# Patient Record
Sex: MALE | Race: WHITE | Employment: OTHER | URBAN - METROPOLITAN AREA
[De-identification: names, ages, dates, MRNs, and addresses within clinical notes are randomized per-mention and may not be internally consistent; named-entity substitution may affect disease eponyms.]

---

## 2020-01-22 ENCOUNTER — OFFICE VISIT (OUTPATIENT)
Dept: SURGERY | Age: 85
End: 2020-01-22
Payer: MEDICAID

## 2020-01-22 ENCOUNTER — TELEPHONE (OUTPATIENT)
Dept: SURGERY | Age: 85
End: 2020-01-22

## 2020-01-22 VITALS
SYSTOLIC BLOOD PRESSURE: 127 MMHG | HEART RATE: 87 BPM | BODY MASS INDEX: 20.25 KG/M2 | OXYGEN SATURATION: 90 % | DIASTOLIC BLOOD PRESSURE: 85 MMHG | HEIGHT: 67 IN | WEIGHT: 129 LBS

## 2020-01-22 PROCEDURE — 43762 RPLC GTUBE NO REVJ TRC: CPT | Performed by: SURGERY

## 2020-01-22 PROCEDURE — 99213 OFFICE O/P EST LOW 20 MIN: CPT | Performed by: SURGERY

## 2020-01-22 NOTE — TELEPHONE ENCOUNTER
20fr peg tube exchange done in office.   Electronically signed by Darien Schumacher RN on 1/22/2020 at 11:58 AM

## 2020-01-23 NOTE — PROGRESS NOTES
General Surgery History and Physical  [facility]    Patient's Name/Date of Birth: Georgina Torres / 1935    Date: January 23, 2020     Surgeon: Estefany Martinez M.D.    PCP: Una Easley DO     Chief Complaint: PEG malfunction    HPI:   Georgina Torres is a 82 y.o. male who presents for evaluation of PEG malfunction. Timing is constant, radiation to midline, alleviated by none and started unk and severity is 7/10. Brought to office with no accompanying care giver. No family present. PEG tube evaluated as was the source of his appointment. Appears in poor repair. Patient is nonverbal. Tube appears cracked and will not flush.      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    Falls frequently    Malnutrition of moderate degree (HCC)    Acute kidney injury ([address]    Hyperosmolality and hypernatremia    Hyperchloremia    Hypermagnesemia    Hypophosphatemia    Hypotension    Anemia, chronic disease    PEG tube malfunction (HCC)    Hypertension    Hyperlipidemia    Dysphagia    Dementia ([address]    Alzheimer's dementia ([address]    Parkinson disease ([address]    Malnutrition ([address]       Past Medical History:   Diagnosis Date    Altered mental status     Alzheimer's dementia ([address]     Anemia     Arthritis     Chronic kidney failure     Dementia (HCC)     Dysphagia     GERD (gastroesophageal reflux disease)     Hyperlipidemia     Hypertension     Malnutrition ([address]     Parkinson disease ([address]     Skin cancer     treating skin ca up to one year ago       Past Surgical History:   Procedure Laterality Date    ENDOSCOPY, COLON, DIAGNOSTIC      peg tube insertion    HEMORRHOID SURGERY      HERNIA REPAIR      NOSE SURGERY         Allergies   Allergen Reactions    No Known Allergies        The

## 2020-10-26 ENCOUNTER — OFFICE VISIT (OUTPATIENT)
Dept: SURGERY | Age: 85
End: 2020-10-26
Payer: MEDICAID

## 2020-10-26 VITALS
HEIGHT: 67 IN | DIASTOLIC BLOOD PRESSURE: 78 MMHG | HEART RATE: 92 BPM | BODY MASS INDEX: 20.25 KG/M2 | SYSTOLIC BLOOD PRESSURE: 121 MMHG | WEIGHT: 129 LBS | TEMPERATURE: 98.8 F

## 2020-10-26 PROCEDURE — 99213 OFFICE O/P EST LOW 20 MIN: CPT | Performed by: SURGERY

## 2020-10-26 RX ORDER — NYSTATIN 100000 [USP'U]/G
POWDER TOPICAL
COMMUNITY

## 2020-10-26 NOTE — PROGRESS NOTES
General Surgery History and Physical  [facility]    Patient's Name/Date of Birth: Jaqueline Lennon / 1935    Date: October 26, 2020     Surgeon: Ava Carmona M.D.    PCP: Mil De Dios DO     Chief Complaint: PEG dislodgment    HPI:   Jaqueline Lennon is a 82 y.o. male who presents for evaluation of PEG dislodgment, malfunction. Timing is constant, radiation to upper abdomen, alleviated by none and started yesterday and severity is 7/10. Patient with a history of severe altered mental status immobility and dementia. Has a PEG tube in place for nutritional feedings has had the PEG become dislodged twice over the last week. Was seen in the emergency department had a replaced with an 25 Setswana Garcia catheter. Patient now presents today for replacement of his PEG tube. He is accompanied by caregiver and he is brought in by squad and remains on the gurney. He is nonverbal history was obtained from EMR.     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    Falls frequently    Malnutrition of moderate degree (HCC)    Acute kidney injury ([address]    Hyperosmolality and hypernatremia    Hyperchloremia    Hypermagnesemia    Hypophosphatemia    Hypotension    Anemia, chronic disease    PEG tube malfunction (HCC)    Hypertension    Hyperlipidemia    Dysphagia    Dementia ([address]    Alzheimer's dementia ([address]    Parkinson disease ([address]    Malnutrition ([address]       Past Medical History:   Diagnosis Date    Altered mental status     Alzheimer's dementia ([address]     Anemia     Arthritis     Chronic kidney failure     Dementia (HCC)     Dysphagia     GERD (gastroesophageal reflux disease)     Hyperlipidemia     Hypertension     Malnutrition ([address]     Parkinson disease ([address]     Skin cancer treating skin ca up to one year ago       Past Surgical History:   Procedure Laterality Date    ENDOSCOPY, COLON, DIAGNOSTIC      peg tube insertion    HEMORRHOID SURGERY      HERNIA REPAIR      NOSE SURGERY         Allergies   Allergen Reactions    No Known Allergies        The patient has a family history that is negative for severe cardiovascular or respiratory issues, negative for reaction to anesthesia. Time spent reviewing past medical, surgical, social and family history, vitals, nursing assessment and images. No changes from above documented history.     Social History     Socioeconomic History    Marital status:      Spouse name: Not on file    Number of children: Not on file    Years of education: Not on file    Highest education level: Not on file   Occupational History    Not on file   Social Needs    Financial resource strain: Not on file    Food insecurity     Worry: Not on file     Inability: Not on file    Transportation needs     Medical: Not on file     Non-medical: Not on file   Tobacco Use    Smoking status: Never Smoker    Smokeless tobacco: Never Used   Substance and Sexual Activity    Alcohol use: No    Drug use: No    Sexual activity: Never   Lifestyle    Physical activity     Days per week: Not on file     Minutes per session: Not on file    Stress: Not on file   Relationships    Social connections     Talks on phone: Not on file     Gets together: Not on file     Attends Islam service: Not on file     Active member of club or organization: Not on file     Attends meetings of clubs or organizations: Not on file     Relationship status: Not on file    Intimate partner violence     Fear of current or ex partner: Not on file     Emotionally abused: Not on file     Physically abused: Not on file     Forced sexual activity: Not on file   Other Topics Concern    Not on file   Social History Narrative    Not on file       I have reviewed relevant labs from this  Parkinson's disease ([address]    Hypertension    Impaired mobility and activities of daily living    Weakness    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    Falls frequently    Malnutrition of moderate degree (HCC)    Acute kidney injury ([address]    Hyperosmolality and hypernatremia    Hyperchloremia    Hypermagnesemia    Hypophosphatemia    Hypotension    Anemia, chronic disease    PEG tube malfunction (HCC)    Hypertension    Hyperlipidemia    Dysphagia    Dementia ([address]    Alzheimer's dementia ([address]    Parkinson disease ([address]    Malnutrition ([address]         Plan:  Place PEG tube at bedside with a 20 French  Follow-up as needed  Gastrostomy Tube Replacement Procedure Note    Indication: Spontaneous dislodgement    Procedure: The patient was placed in the semi recumbent position and the patient's gastric tube was replaced using a 20 french gastrostomy tube and the bulb was inflated using 15 cc of sterile water. The placement verification was not performed at this time. Gastric contents were appreciated on placement consistent with placement a good location    The patient tolerated the procedure well.     Complications: None          Tamiko Portillo MD  1:33 PM  10/26/2020

## 2020-11-03 PROBLEM — I10 HYPERTENSION: Status: RESOLVED | Noted: 2020-11-03 | Resolved: 2020-11-03

## 2020-11-03 PROBLEM — E78.5 HYPERLIPIDEMIA: Status: RESOLVED | Noted: 2020-11-03 | Resolved: 2020-11-03

## 2022-02-07 ENCOUNTER — APPOINTMENT (OUTPATIENT)
Dept: GENERAL RADIOLOGY | Age: 87
End: 2022-02-07
Payer: MEDICARE

## 2022-02-07 ENCOUNTER — HOSPITAL ENCOUNTER (EMERGENCY)
Age: 87
Discharge: HOME | End: 2022-02-07
Attending: EMERGENCY MEDICINE
Payer: MEDICARE

## 2022-02-07 VITALS
HEART RATE: 81 BPM | HEIGHT: 67 IN | BODY MASS INDEX: 20.4 KG/M2 | RESPIRATION RATE: 18 BRPM | SYSTOLIC BLOOD PRESSURE: 135 MMHG | DIASTOLIC BLOOD PRESSURE: 66 MMHG | TEMPERATURE: 97.9 F | WEIGHT: 130 LBS | OXYGEN SATURATION: 95 %

## 2022-02-07 DIAGNOSIS — T85.598A FEEDING TUBE DYSFUNCTION, INITIAL ENCOUNTER: Primary | ICD-10-CM

## 2022-02-07 PROCEDURE — 74018 RADEX ABDOMEN 1 VIEW: CPT

## 2022-02-07 PROCEDURE — 99283 EMERGENCY DEPT VISIT LOW MDM: CPT

## 2022-02-07 NOTE — ED NOTES
Attempted to call nurse to nurse to [facility] and rehab.      Jo De Leon, RN  02/07/22 [facility], [address], RN  02/07/22 2482 20 y/o M with past hx of appendectomy and bulging discs presents to the ED c/o vomiting, body aches, fever, and abdominal pain which onset yesterday morning. Pt has been unable to hold down food. He denies diarrhea. No further complaints at this time.

## 2022-02-07 NOTE — ED NOTES
CALL PLACED TO [facility]; CAN P/U PATIENT AT 1830; PHYSICIANS TRIP CANCELED      Meagan Walker  02/07/22 3324

## 2022-02-07 NOTE — ED PROVIDER NOTES
Patient brought in to the ED for evaluation. Patient is from the nursing facility and his PEG tube is dislodged. They state that it came out shortly prior to arrival.  Patient is nonverbal and cannot provide any HPI. This is a chronic indwelling PEG tube. PEG tube is a 25 [employer] with a 10 cc balloon. Review of Systems   Unable to perform ROS: Patient nonverbal        Physical Exam  Vitals and nursing note reviewed. Constitutional:       General: He is not in acute distress. Appearance: He is well-developed. HENT:      Head: Normocephalic and atraumatic. Eyes:      Conjunctiva/sclera: Conjunctivae normal.   Cardiovascular:      Rate and Rhythm: Normal rate and regular rhythm. Heart sounds: Normal heart sounds. No murmur heard. Pulmonary:      Effort: Pulmonary effort is normal. No respiratory distress. Breath sounds: Normal breath sounds. No wheezing or rales. Abdominal:      General: Bowel sounds are normal. There is no distension. Palpations: Abdomen is soft. Tenderness: There is no abdominal tenderness. There is no guarding or rebound. Comments: Ostomy patent in upper abdomen. No surrounding erythema. No discharge from the ostomy. PEG tube is dislodged completely. Musculoskeletal:      Cervical back: Normal range of motion and neck supple. Skin:     General: Skin is warm and dry. Neurological:      Mental Status: He is alert. Comments: Patient in a slightly contracted state which appears chronic          Procedures     MDM   Patient presented to the ED for evaluation of a dislodged PEG tube. The. The patient was then sent to radiology for a KUB with contrast injection. The imaging does show proper positioning of the feeding tube.   Will be discharged back to his facility.         --------------------------------------------- PAST HISTORY ---------------------------------------------  Past Medical History:  has a past medical history of Altered mental status, Alzheimer's dementia ([facility] Anemia, Arthritis, Chronic kidney failure, Dementia ([facility] Dysphagia, GERD (gastroesophageal reflux disease), Hyperlipidemia, Hypertension, Malnutrition ([facility] Parkinson disease ([facility] and Skin cancer. Past Surgical History:  has a past surgical history that includes hernia repair; Hemorrhoid surgery; Nose surgery; and Endoscopy, colon, diagnostic. Social History:  reports that he has never smoked. He has never used smokeless tobacco. He reports that he does not drink alcohol and does not use drugs. Family History: family history includes Diabetes in his mother; Mental Illness in his father and sister. The patients home medications have been reviewed. Allergies: No known allergies    -------------------------------------------------- RESULTS -------------------------------------------------  Labs:  No results found for this visit on 02/07/22. Radiology:  XR ABDOMEN (KUB) (SINGLE AP VIEW)   Final Result   1. Satisfactory position of the gastrostomy tube within the stomach.             ------------------------- NURSING NOTES AND VITALS REVIEWED ---------------------------  Date / Time Roomed:  2/7/2022  2:25 PM  ED Bed Assignment:  HALL/H1    The nursing notes within the ED encounter and vital signs as below have been reviewed. /66   Pulse 81   Temp 97.9 °F (36.6 °C) (Axillary)   Resp 18   Ht 5' 7\" (1.702 m)   Wt 130 lb (59 kg)   SpO2 95%   BMI 20.36 kg/m²   Oxygen Saturation Interpretation: Normal      ------------------------------------------ PROGRESS NOTES ------------------------------------------  I have spoken with the patient and discussed todays results, in addition to providing specific details for the plan of care and counseling regarding the diagnosis and prognosis. Their questions are answered at this time and they are agreeable with the plan.  I discussed at length with them reasons for immediate return here for re evaluation. They will followup with primary care by calling their office tomorrow. --------------------------------- ADDITIONAL PROVIDER NOTES ---------------------------------  At this time the patient is without objective evidence of an acute process requiring hospitalization or inpatient management. They have remained hemodynamically stable throughout their entire ED visit and are stable for discharge with outpatient follow-up. The plan has been discussed in detail and they are aware of the specific conditions for emergent return, as well as the importance of follow-up. New Prescriptions    No medications on file       Diagnosis:  1. Feeding tube dysfunction, initial encounter        Disposition:  Patient's disposition: Discharge to nursing home  Patient's condition is stable.            Sushma Gibson DO  02/07/22 1763

## 2022-02-07 NOTE — ED NOTES
Per EMS, patient here for removed PEG tube. Patient at [address] [place] [address], no report received from facility and no paperwork provided. Patient non-verbal, unable to contribute to triage.        Sandeep Howard RN  02/07/22 7613

## 2022-05-17 ENCOUNTER — HOSPITAL ENCOUNTER (EMERGENCY)
Age: 87
Discharge: HOME | End: 2022-05-17
Attending: EMERGENCY MEDICINE
Payer: MEDICARE

## 2022-05-17 ENCOUNTER — APPOINTMENT (OUTPATIENT)
Dept: CT IMAGING | Age: 87
End: 2022-05-17
Payer: MEDICARE

## 2022-05-17 ENCOUNTER — APPOINTMENT (OUTPATIENT)
Dept: GENERAL RADIOLOGY | Age: 87
End: 2022-05-17
Payer: MEDICARE

## 2022-05-17 VITALS
BODY MASS INDEX: 21.19 KG/M2 | OXYGEN SATURATION: 93 % | DIASTOLIC BLOOD PRESSURE: 70 MMHG | RESPIRATION RATE: 18 BRPM | SYSTOLIC BLOOD PRESSURE: 104 MMHG | WEIGHT: 135 LBS | TEMPERATURE: 98.7 F | HEART RATE: 84 BPM | HEIGHT: 67 IN

## 2022-05-17 DIAGNOSIS — I72.3 ILIAC ANEURYSM (HCC): ICD-10-CM

## 2022-05-17 DIAGNOSIS — J18.9 PNEUMONIA DUE TO INFECTIOUS ORGANISM, UNSPECIFIED LATERALITY, UNSPECIFIED PART OF LUNG: ICD-10-CM

## 2022-05-17 DIAGNOSIS — K59.00 CONSTIPATION, UNSPECIFIED CONSTIPATION TYPE: ICD-10-CM

## 2022-05-17 DIAGNOSIS — K92.0 HEMATEMESIS WITH NAUSEA: Primary | ICD-10-CM

## 2022-05-17 LAB
ABO/RH: NORMAL
ALBUMIN SERPL-MCNC: 3.7 G/DL (ref 3.5–5.2)
ALP BLD-CCNC: 110 U/L (ref 40–129)
ALT SERPL-CCNC: <5 U/L (ref 0–40)
ANION GAP SERPL CALCULATED.3IONS-SCNC: 10 MMOL/L (ref 7–16)
ANTIBODY SCREEN: NORMAL
APTT: 25.4 SEC (ref 24.5–35.1)
AST SERPL-CCNC: 18 U/L (ref 0–39)
BACTERIA: ABNORMAL /HPF
BASOPHILS ABSOLUTE: 0.05 E9/L (ref 0–0.2)
BASOPHILS RELATIVE PERCENT: 0.2 % (ref 0–2)
BILIRUB SERPL-MCNC: 0.4 MG/DL (ref 0–1.2)
BILIRUBIN URINE: NEGATIVE
BLOOD, URINE: ABNORMAL
BUN BLDV-MCNC: 27 MG/DL (ref 6–23)
CALCIUM SERPL-MCNC: 9.4 MG/DL (ref 8.6–10.2)
CHLORIDE BLD-SCNC: 100 MMOL/L (ref 98–107)
CLARITY: CLEAR
CO2: 26 MMOL/L (ref 22–29)
COLOR: YELLOW
CREAT SERPL-MCNC: 0.6 MG/DL (ref 0.7–1.2)
EKG ATRIAL RATE: 103 BPM
EKG P AXIS: 62 DEGREES
EKG P-R INTERVAL: 200 MS
EKG Q-T INTERVAL: 350 MS
EKG QRS DURATION: 62 MS
EKG QTC CALCULATION (BAZETT): 458 MS
EKG R AXIS: -9 DEGREES
EKG T AXIS: -14 DEGREES
EKG VENTRICULAR RATE: 103 BPM
EOSINOPHILS ABSOLUTE: 0.02 E9/L (ref 0.05–0.5)
EOSINOPHILS RELATIVE PERCENT: 0.1 % (ref 0–6)
GFR AFRICAN AMERICAN: >60
GFR NON-AFRICAN AMERICAN: >60 ML/MIN/1.73
GLUCOSE BLD-MCNC: 107 MG/DL (ref 74–99)
GLUCOSE URINE: NEGATIVE MG/DL
HCT VFR BLD CALC: 45.1 % (ref 37–54)
HEMOGLOBIN: 15 G/DL (ref 12.5–16.5)
IMMATURE GRANULOCYTES #: 0.09 E9/L
IMMATURE GRANULOCYTES %: 0.4 % (ref 0–5)
INR BLD: 1
KETONES, URINE: ABNORMAL MG/DL
LACTIC ACID: 1.6 MMOL/L (ref 0.5–2.2)
LEUKOCYTE ESTERASE, URINE: NEGATIVE
LYMPHOCYTES ABSOLUTE: 0.6 E9/L (ref 1.5–4)
LYMPHOCYTES RELATIVE PERCENT: 3 % (ref 20–42)
MCH RBC QN AUTO: 31.6 PG (ref 26–35)
MCHC RBC AUTO-ENTMCNC: 33.3 % (ref 32–34.5)
MCV RBC AUTO: 95.1 FL (ref 80–99.9)
MONOCYTES ABSOLUTE: 0.83 E9/L (ref 0.1–0.95)
MONOCYTES RELATIVE PERCENT: 4.1 % (ref 2–12)
NEUTROPHILS ABSOLUTE: 18.62 E9/L (ref 1.8–7.3)
NEUTROPHILS RELATIVE PERCENT: 92.2 % (ref 43–80)
NITRITE, URINE: NEGATIVE
PDW BLD-RTO: 13.1 FL (ref 11.5–15)
PH UA: 7.5 (ref 5–9)
PLATELET # BLD: 296 E9/L (ref 130–450)
PMV BLD AUTO: 10.9 FL (ref 7–12)
POTASSIUM SERPL-SCNC: 5.2 MMOL/L (ref 3.5–5)
PROTEIN UA: NEGATIVE MG/DL
PROTHROMBIN TIME: 11 SEC (ref 9.3–12.4)
RBC # BLD: 4.74 E12/L (ref 3.8–5.8)
RBC UA: ABNORMAL /HPF (ref 0–2)
SODIUM BLD-SCNC: 136 MMOL/L (ref 132–146)
SPECIFIC GRAVITY UA: 1.01 (ref 1–1.03)
TOTAL PROTEIN: 6.9 G/DL (ref 6.4–8.3)
TROPONIN, HIGH SENSITIVITY: 14 NG/L (ref 0–11)
TROPONIN, HIGH SENSITIVITY: 15 NG/L (ref 0–11)
UROBILINOGEN, URINE: 1 E.U./DL
WBC # BLD: 20.2 E9/L (ref 4.5–11.5)
WBC UA: ABNORMAL /HPF (ref 0–5)

## 2022-05-17 PROCEDURE — 80053 COMPREHEN METABOLIC PANEL: CPT

## 2022-05-17 PROCEDURE — 2580000003 HC RX 258: Performed by: EMERGENCY MEDICINE

## 2022-05-17 PROCEDURE — 74177 CT ABD & PELVIS W/CONTRAST: CPT

## 2022-05-17 PROCEDURE — 6360000002 HC RX W HCPCS: Performed by: EMERGENCY MEDICINE

## 2022-05-17 PROCEDURE — 6370000000 HC RX 637 (ALT 250 FOR IP): Performed by: EMERGENCY MEDICINE

## 2022-05-17 PROCEDURE — 86850 RBC ANTIBODY SCREEN: CPT

## 2022-05-17 PROCEDURE — 85610 PROTHROMBIN TIME: CPT

## 2022-05-17 PROCEDURE — 99285 EMERGENCY DEPT VISIT HI MDM: CPT

## 2022-05-17 PROCEDURE — 93005 ELECTROCARDIOGRAM TRACING: CPT | Performed by: EMERGENCY MEDICINE

## 2022-05-17 PROCEDURE — 85025 COMPLETE CBC W/AUTO DIFF WBC: CPT

## 2022-05-17 PROCEDURE — 36415 COLL VENOUS BLD VENIPUNCTURE: CPT

## 2022-05-17 PROCEDURE — 81001 URINALYSIS AUTO W/SCOPE: CPT

## 2022-05-17 PROCEDURE — 6360000004 HC RX CONTRAST MEDICATION: Performed by: RADIOLOGY

## 2022-05-17 PROCEDURE — C9113 INJ PANTOPRAZOLE SODIUM, VIA: HCPCS | Performed by: EMERGENCY MEDICINE

## 2022-05-17 PROCEDURE — 86901 BLOOD TYPING SEROLOGIC RH(D): CPT

## 2022-05-17 PROCEDURE — 71045 X-RAY EXAM CHEST 1 VIEW: CPT

## 2022-05-17 PROCEDURE — 96375 TX/PRO/DX INJ NEW DRUG ADDON: CPT

## 2022-05-17 PROCEDURE — 84484 ASSAY OF TROPONIN QUANT: CPT

## 2022-05-17 PROCEDURE — 96374 THER/PROPH/DIAG INJ IV PUSH: CPT

## 2022-05-17 PROCEDURE — 93010 ELECTROCARDIOGRAM REPORT: CPT | Performed by: INTERNAL MEDICINE

## 2022-05-17 PROCEDURE — 83605 ASSAY OF LACTIC ACID: CPT

## 2022-05-17 PROCEDURE — 86900 BLOOD TYPING SEROLOGIC ABO: CPT

## 2022-05-17 PROCEDURE — 85730 THROMBOPLASTIN TIME PARTIAL: CPT

## 2022-05-17 RX ORDER — CEFDINIR 300 MG/1
300 CAPSULE ORAL 2 TIMES DAILY
Qty: 14 CAPSULE | Refills: 0 | Status: SHIPPED | OUTPATIENT
Start: 2022-05-17 | End: 2022-05-24

## 2022-05-17 RX ORDER — DOXYCYCLINE HYCLATE 100 MG
100 TABLET ORAL 2 TIMES DAILY
Qty: 20 TABLET | Refills: 0 | Status: SHIPPED | OUTPATIENT
Start: 2022-05-17 | End: 2022-05-27

## 2022-05-17 RX ORDER — POLYETHYLENE GLYCOL 3350 17 G/17G
17 POWDER, FOR SOLUTION ORAL 2 TIMES DAILY PRN
Qty: 527 G | Refills: 0 | Status: SHIPPED | OUTPATIENT
Start: 2022-05-17 | End: 2022-06-16

## 2022-05-17 RX ORDER — ONDANSETRON 2 MG/ML
4 INJECTION INTRAMUSCULAR; INTRAVENOUS ONCE
Status: COMPLETED | OUTPATIENT
Start: 2022-05-17 | End: 2022-05-17

## 2022-05-17 RX ORDER — PANTOPRAZOLE SODIUM 40 MG/10ML
40 INJECTION, POWDER, LYOPHILIZED, FOR SOLUTION INTRAVENOUS ONCE
Status: COMPLETED | OUTPATIENT
Start: 2022-05-17 | End: 2022-05-17

## 2022-05-17 RX ORDER — DOXYCYCLINE HYCLATE 100 MG/1
100 CAPSULE ORAL ONCE
Status: COMPLETED | OUTPATIENT
Start: 2022-05-17 | End: 2022-05-17

## 2022-05-17 RX ORDER — 0.9 % SODIUM CHLORIDE 0.9 %
1000 INTRAVENOUS SOLUTION INTRAVENOUS ONCE
Status: COMPLETED | OUTPATIENT
Start: 2022-05-17 | End: 2022-05-17

## 2022-05-17 RX ADMIN — CEFTRIAXONE 2000 MG: 2 INJECTION, POWDER, FOR SOLUTION INTRAMUSCULAR; INTRAVENOUS at 11:05

## 2022-05-17 RX ADMIN — IOPAMIDOL 75 ML: 755 INJECTION, SOLUTION INTRAVENOUS at 09:05

## 2022-05-17 RX ADMIN — SODIUM CHLORIDE 1000 ML: 9 INJECTION, SOLUTION INTRAVENOUS at 07:28

## 2022-05-17 RX ADMIN — ONDANSETRON 4 MG: 2 INJECTION INTRAMUSCULAR; INTRAVENOUS at 07:19

## 2022-05-17 RX ADMIN — PANTOPRAZOLE SODIUM 40 MG: 40 INJECTION, POWDER, FOR SOLUTION INTRAVENOUS at 07:19

## 2022-05-17 RX ADMIN — DOXYCYCLINE HYCLATE 100 MG: 100 CAPSULE ORAL at 11:05

## 2022-05-17 ASSESSMENT — PAIN - FUNCTIONAL ASSESSMENT: PAIN_FUNCTIONAL_ASSESSMENT: ADULT NONVERBAL PAIN SCALE (NPVS)

## 2022-05-17 NOTE — ED NOTES
[place]iology [place] [facility] [place]  05/17/22 9651

## 2022-05-17 NOTE — ED PROVIDER NOTES
Patient is an 83 y/o male who presents to the ED via EMS from the nursing home with coffee ground emesis. Patient is nonverbal and provides no additional history. Review of Systems   Unable to perform ROS: Patient nonverbal        Physical Exam  Vitals and nursing note reviewed. Constitutional:       Appearance: He is ill-appearing. HENT:      Head: Normocephalic and atraumatic. Right Ear: External ear normal.      Left Ear: External ear normal.      Nose: Nose normal.      Mouth/Throat:      Mouth: Mucous membranes are moist.   Eyes:      Conjunctiva/sclera: Conjunctivae normal.      Pupils: Pupils are equal, round, and reactive to light. Cardiovascular:      Rate and Rhythm: Normal rate and regular rhythm. Heart sounds: No murmur heard. Pulmonary:      Effort: Pulmonary effort is normal. No respiratory distress. Breath sounds: Normal breath sounds. No stridor. No wheezing, rhonchi or rales. Abdominal:      General: Bowel sounds are normal. There is no distension. Palpations: Abdomen is soft. Tenderness: There is no abdominal tenderness. There is no guarding. Comments: Peg tube present. No erythema or drainage noted. Musculoskeletal:         General: Normal range of motion. Cervical back: Normal range of motion and neck supple. Skin:     General: Skin is warm and dry. Neurological:      Mental Status: He is lethargic. Procedures     MDM     ED Course as of 05/17/22 1033   Tue May 17, 2022   0725 7:25 AM EDT  I received this patient at sign out from Dr. Janie Cleveland. I have discussed the patient's initial exam, treatment and plan of care with the out going physician. I have introduced my self to the patient / family and have answered their questions to this point. I have examined the patient myself and reviewed ordered tests / medications and  reviewed any available results to this point.   If a resident is involved in the Emergency Department care, I have discussed my findings and plan with them as well. Patient in an overall contracted state, nonverbal, does not follow commands. Abdomen soft with no rigidity. He has no jaundice or icterus. Lungs are clear anteriorly. Heart rate regular. [NC]      ED Course User Index  [NC] Marcia Fonseca DO     EKG:  Sinus tachycardia with ventricular rate of 103 and PACs. AR interval, QRS duration and QT interval within normal range. Normal axis. Nonspecific ST segment and T wave changes. No previous EKG.      7:11 AM EDT  I have signed this patient out to the oncoming physician, Dr. Marcos Abad.  I have discussed the patient's initial exam, treatment and plan of care with the on coming physician. I have notified the patient / family of the change in treating physician and answered their questions to this point. ED Course as of 05/17/22 1033   Tue May 17, 2022   0725 7:25 AM EDT  I received this patient at sign out from Dr. YRN RUSSELL. I have discussed the patient's initial exam, treatment and plan of care with the out going physician. I have introduced my self to the patient / family and have answered their questions to this point. I have examined the patient myself and reviewed ordered tests / medications and  reviewed any available results to this point. If a resident is involved in the Emergency Department care, I have discussed my findings and plan with them as well. Patient in an overall contracted state, nonverbal, does not follow commands. Abdomen soft with no rigidity. He has no jaundice or icterus. Lungs are clear anteriorly.   Heart rate regular. [NC]      ED Course User Index  [NC] Marcia Fonseca DO       --------------------------------------------- PAST HISTORY ---------------------------------------------  Past Medical History:  has a past medical history of Altered mental status, Alzheimer's dementia ([facility] Anemia, Arthritis, Chronic kidney failure, Dementia ([facility] Dysphagia, GERD (gastroesophageal reflux disease), Hyperlipidemia, Hypertension, Malnutrition ([facility] Parkinson disease ([facility] and Skin cancer. Past Surgical History:  has a past surgical history that includes hernia repair; Hemorrhoid surgery; Nose surgery; and Endoscopy, colon, diagnostic. Social History:  reports that he has never smoked. He has never used smokeless tobacco. He reports that he does not drink alcohol and does not use drugs. Family History: family history includes Diabetes in his mother; Mental Illness in his father and sister. The patients home medications have been reviewed.     Allergies: No known allergies    -------------------------------------------------- RESULTS -------------------------------------------------  Labs:  Results for orders placed or performed during the hospital encounter of 05/17/22   CBC with Auto Differential   Result Value Ref Range    WBC 20.2 (H) 4.5 - 11.5 E9/L    RBC 4.74 3.80 - 5.80 E12/L    Hemoglobin 15.0 12.5 - 16.5 g/dL    Hematocrit 45.1 37.0 - 54.0 %    MCV 95.1 80.0 - 99.9 fL    MCH 31.6 26.0 - 35.0 pg    MCHC 33.3 32.0 - 34.5 %    RDW 13.1 11.5 - 15.0 fL    Platelets 560 233 - 470 E9/L    MPV 10.9 7.0 - 12.0 fL    Neutrophils % 92.2 (H) 43.0 - 80.0 %    Immature Granulocytes % 0.4 0.0 - 5.0 %    Lymphocytes % 3.0 (L) 20.0 - 42.0 %    Monocytes % 4.1 2.0 - 12.0 %    Eosinophils % 0.1 0.0 - 6.0 %    Basophils % 0.2 0.0 - 2.0 %    Neutrophils Absolute 18.62 (H) 1.80 - 7.30 E9/L    Immature Granulocytes # 0.09 E9/L    Lymphocytes Absolute 0.60 (L) 1.50 - 4.00 E9/L    Monocytes Absolute 0.83 0.10 - 0.95 E9/L    Eosinophils Absolute 0.02 (L) 0.05 - 0.50 E9/L    Basophils Absolute 0.05 0.00 - 0.20 E9/L   Comprehensive Metabolic Panel   Result Value Ref Range    Sodium 136 132 - 146 mmol/L    Potassium 5.2 (H) 3.5 - 5.0 mmol/L    Chloride 100 98 - 107 mmol/L    CO2 26 22 - 29 mmol/L    Anion Gap 10 7 - 16 mmol/L    Glucose 107 (H) 74 - 99 mg/dL    BUN 27 (H) 6 - 23 mg/dL    CREATININE 0.6 (L) 0.7 - 1.2 mg/dL    GFR Non-African American >60 >=60 mL/min/1.73    GFR African American >60     Calcium 9.4 8.6 - 10.2 mg/dL    Total Protein 6.9 6.4 - 8.3 g/dL    Albumin 3.7 3.5 - 5.2 g/dL    Total Bilirubin 0.4 0.0 - 1.2 mg/dL    Alkaline Phosphatase 110 40 - 129 U/L    ALT <5 0 - 40 U/L    AST 18 0 - 39 U/L   Troponin   Result Value Ref Range    Troponin, High Sensitivity 15 (H) 0 - 11 ng/L   Protime-INR   Result Value Ref Range    Protime 11.0 9.3 - 12.4 sec    INR 1.0    APTT   Result Value Ref Range    aPTT 25.4 24.5 - 35.1 sec   Lactic Acid   Result Value Ref Range    Lactic Acid 1.6 0.5 - 2.2 mmol/L   Troponin   Result Value Ref Range    Troponin, High Sensitivity 14 (H) 0 - 11 ng/L   Urinalysis   Result Value Ref Range    Color, UA Yellow Straw/Yellow    Clarity, UA Clear Clear    Glucose, Ur Negative Negative mg/dL    Bilirubin Urine Negative Negative    Ketones, Urine TRACE (A) Negative mg/dL    Specific Gravity, UA 1.010 1.005 - 1.030    Blood, Urine TRACE (A) Negative    pH, UA 7.5 5.0 - 9.0    Protein, UA Negative Negative mg/dL    Urobilinogen, Urine 1.0 <2.0 E.U./dL    Nitrite, Urine Negative Negative    Leukocyte Esterase, Urine Negative Negative   Microscopic Urinalysis   Result Value Ref Range    WBC, UA NONE 0 - 5 /HPF    RBC, UA 1-3 0 - 2 /HPF    Bacteria, UA RARE (A) None Seen /HPF   EKG 12 Lead   Result Value Ref Range    Ventricular Rate 103 BPM    Atrial Rate 103 BPM    P-R Interval 200 ms    QRS Duration 62 ms    Q-T Interval 350 ms    QTc Calculation (Bazett) 458 ms    P Axis 62 degrees    R Axis -9 degrees    T Axis -14 degrees   TYPE AND SCREEN   Result Value Ref Range    ABO/Rh B POS     Antibody Screen NEG        Radiology:  XR CHEST PORTABLE   Final Result   Suboptimal evaluation of the left mid to upper lung field, as described   above. Suggestion of left lung volume loss. Atelectasis at the left lung   base.   Increased interstitial markings in the right mid to upper lung field,   along with hazy opacification. Question pulmonary vascular congestion. CT ABDOMEN PELVIS W IV CONTRAST Additional Contrast? None   Final Result   1. Moderate amount of stool throughout the colon. No bowel obstruction, free   air, or free fluid. 2. 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   3. Prostatomegaly   4. Atelectasis in left lung base with volume loss.             ------------------------- NURSING NOTES AND VITALS REVIEWED ---------------------------  Date / Time Roomed:  5/17/2022  5:59 AM  ED Bed Assignment:  15/15    The nursing notes within the ED encounter and vital signs as below have been reviewed. /71   Pulse 100   Temp 98.7 °F (37.1 °C) (Axillary)   Resp 20   Ht 5' 7\" (1.702 m)   Wt 135 lb (61.2 kg)   SpO2 95%   BMI 21.14 kg/m²   Oxygen Saturation Interpretation: Normal            --------------------------------- ADDITIONAL PROVIDER NOTES ---------------------------------  At this time the patient is without objective evidence of an acute process requiring hospitalization or inpatient management. They have remained hemodynamically stable throughout their entire ED visit and are stable for discharge with outpatient follow-up. New Prescriptions    CEFDINIR (OMNICEF) 300 MG CAPSULE    Take 1 capsule by mouth 2 times daily for 7 days    DOXYCYCLINE HYCLATE (VIBRA-TABS) 100 MG TABLET    Take 1 tablet by mouth 2 times daily for 10 days    POLYETHYLENE GLYCOL (MIRALAX) 17 G PACKET    Take 17 g by mouth 2 times daily as needed for Constipation       Diagnosis:  1. Hematemesis with nausea    2. Constipation, unspecified constipation type    3. 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 Pneumonia due to infectious organism, unspecified laterality, unspecified part of lung        Disposition:  Patient's disposition: Discharge to nursing home  Patient's condition is stable.          Massiel Robles DO  05/17/22 6740

## 2022-06-25 ENCOUNTER — APPOINTMENT (OUTPATIENT)
Dept: CT IMAGING | Age: 87
End: 2022-06-25
Payer: MEDICARE

## 2022-06-25 ENCOUNTER — APPOINTMENT (OUTPATIENT)
Dept: GENERAL RADIOLOGY | Age: 87
End: 2022-06-25
Payer: MEDICARE

## 2022-06-25 ENCOUNTER — HOSPITAL ENCOUNTER (EMERGENCY)
Age: 87
Discharge: HOME | End: 2022-06-25
Attending: EMERGENCY MEDICINE
Payer: MEDICARE

## 2022-06-25 VITALS
HEART RATE: 70 BPM | DIASTOLIC BLOOD PRESSURE: 86 MMHG | WEIGHT: 135.69 LBS | RESPIRATION RATE: 15 BRPM | HEIGHT: 67 IN | SYSTOLIC BLOOD PRESSURE: 110 MMHG | OXYGEN SATURATION: 100 % | TEMPERATURE: 97.6 F | BODY MASS INDEX: 21.3 KG/M2

## 2022-06-25 DIAGNOSIS — S01.01XA LACERATION OF SCALP, INITIAL ENCOUNTER: Primary | ICD-10-CM

## 2022-06-25 DIAGNOSIS — E83.51 HYPOCALCEMIA: ICD-10-CM

## 2022-06-25 DIAGNOSIS — E87.6 HYPOKALEMIA: ICD-10-CM

## 2022-06-25 LAB
ANION GAP SERPL CALCULATED.3IONS-SCNC: 9 MMOL/L (ref 7–16)
BUN BLDV-MCNC: 14 MG/DL (ref 6–23)
CALCIUM SERPL-MCNC: 4.6 MG/DL (ref 8.6–10.2)
CHLORIDE BLD-SCNC: 117 MMOL/L (ref 98–107)
CO2: 16 MMOL/L (ref 22–29)
CREAT SERPL-MCNC: 0.4 MG/DL (ref 0.7–1.2)
GFR AFRICAN AMERICAN: >60
GFR NON-AFRICAN AMERICAN: >60 ML/MIN/1.73
GLUCOSE BLD-MCNC: 64 MG/DL (ref 74–99)
HCT VFR BLD CALC: 46.9 % (ref 37–54)
HEMOGLOBIN: 15.7 G/DL (ref 12.5–16.5)
MCH RBC QN AUTO: 32.3 PG (ref 26–35)
MCHC RBC AUTO-ENTMCNC: 33.5 % (ref 32–34.5)
MCV RBC AUTO: 96.5 FL (ref 80–99.9)
METER GLUCOSE: 170 MG/DL (ref 74–99)
PDW BLD-RTO: 13.8 FL (ref 11.5–15)
PLATELET # BLD: 294 E9/L (ref 130–450)
PMV BLD AUTO: 11.4 FL (ref 7–12)
POTASSIUM SERPL-SCNC: 2.9 MMOL/L (ref 3.5–5)
RBC # BLD: 4.86 E12/L (ref 3.8–5.8)
REASON FOR REJECTION: NORMAL
REJECTED TEST: NORMAL
SODIUM BLD-SCNC: 142 MMOL/L (ref 132–146)
TOTAL CK: 38 U/L (ref 20–200)
TROPONIN, HIGH SENSITIVITY: 21 NG/L (ref 0–11)
TROPONIN, HIGH SENSITIVITY: 32 NG/L (ref 0–11)
TROPONIN, HIGH SENSITIVITY: 35 NG/L (ref 0–11)
WBC # BLD: 18.7 E9/L (ref 4.5–11.5)

## 2022-06-25 PROCEDURE — 72125 CT NECK SPINE W/O DYE: CPT

## 2022-06-25 PROCEDURE — 72170 X-RAY EXAM OF PELVIS: CPT

## 2022-06-25 PROCEDURE — 6370000000 HC RX 637 (ALT 250 FOR IP): Performed by: EMERGENCY MEDICINE

## 2022-06-25 PROCEDURE — 96361 HYDRATE IV INFUSION ADD-ON: CPT

## 2022-06-25 PROCEDURE — 2580000003 HC RX 258: Performed by: EMERGENCY MEDICINE

## 2022-06-25 PROCEDURE — 6360000002 HC RX W HCPCS: Performed by: EMERGENCY MEDICINE

## 2022-06-25 PROCEDURE — 82550 ASSAY OF CK (CPK): CPT

## 2022-06-25 PROCEDURE — 80048 BASIC METABOLIC PNL TOTAL CA: CPT

## 2022-06-25 PROCEDURE — 36415 COLL VENOUS BLD VENIPUNCTURE: CPT

## 2022-06-25 PROCEDURE — 71045 X-RAY EXAM CHEST 1 VIEW: CPT

## 2022-06-25 PROCEDURE — 84484 ASSAY OF TROPONIN QUANT: CPT

## 2022-06-25 PROCEDURE — 99285 EMERGENCY DEPT VISIT HI MDM: CPT

## 2022-06-25 PROCEDURE — 96375 TX/PRO/DX INJ NEW DRUG ADDON: CPT

## 2022-06-25 PROCEDURE — 96365 THER/PROPH/DIAG IV INF INIT: CPT

## 2022-06-25 PROCEDURE — 85027 COMPLETE CBC AUTOMATED: CPT

## 2022-06-25 PROCEDURE — 70450 CT HEAD/BRAIN W/O DYE: CPT

## 2022-06-25 PROCEDURE — 93005 ELECTROCARDIOGRAM TRACING: CPT | Performed by: EMERGENCY MEDICINE

## 2022-06-25 PROCEDURE — 82962 GLUCOSE BLOOD TEST: CPT

## 2022-06-25 RX ORDER — 0.9 % SODIUM CHLORIDE 0.9 %
1000 INTRAVENOUS SOLUTION INTRAVENOUS ONCE
Status: COMPLETED | OUTPATIENT
Start: 2022-06-25 | End: 2022-06-25

## 2022-06-25 RX ORDER — DEXTROSE MONOHYDRATE 25 G/50ML
25 INJECTION, SOLUTION INTRAVENOUS ONCE
Status: DISCONTINUED | OUTPATIENT
Start: 2022-06-25 | End: 2022-06-25 | Stop reason: CLARIF

## 2022-06-25 RX ORDER — LORAZEPAM 2 MG/ML
1 INJECTION INTRAMUSCULAR ONCE
Status: COMPLETED | OUTPATIENT
Start: 2022-06-25 | End: 2022-06-25

## 2022-06-25 RX ADMIN — POTASSIUM BICARBONATE 40 MEQ: 782 TABLET, EFFERVESCENT ORAL at 06:52

## 2022-06-25 RX ADMIN — CALCIUM GLUCONATE 1000 MG: 98 INJECTION, SOLUTION INTRAVENOUS at 07:16

## 2022-06-25 RX ADMIN — LORAZEPAM 1 MG: 2 INJECTION INTRAMUSCULAR; INTRAVENOUS at 05:11

## 2022-06-25 RX ADMIN — SODIUM CHLORIDE 1000 ML: 9 INJECTION, SOLUTION INTRAVENOUS at 05:14

## 2022-06-25 RX ADMIN — DEXTROSE MONOHYDRATE 250 ML: 100 INJECTION, SOLUTION INTRAVENOUS at 06:50

## 2022-06-25 ASSESSMENT — ENCOUNTER SYMPTOMS: TACHYPNEA: 1

## 2022-06-25 ASSESSMENT — PAIN - FUNCTIONAL ASSESSMENT: PAIN_FUNCTIONAL_ASSESSMENT: NONE - DENIES PAIN

## 2022-06-25 NOTE — ED NOTES
ER doc notified on Critical Erich of 4.6     Monalisa Price, [address] [facility]  06/25/22 3670

## 2022-06-25 NOTE — ED NOTES
Head lac repaired with 3 staples. Area cleaned with peroxide.       Emilee Kessler RN  06/25/22 1196

## 2022-06-25 NOTE — ED PROVIDER NOTES
Patient is an 83 y/o male who presents to the ED via EMS from the nursing home. EMS reported that the patient had an unwitnessed fall from bed tonight. He was found to have a laceration of his scalp. Patient has a history of dementia and is nonverbal. No other history is available at this time. Review of Systems   Unable to perform ROS: Dementia   Skin: Positive for wound. Physical Exam  Vitals and nursing note reviewed. Constitutional:       General: He is not in acute distress. HENT:      Head:      Comments: 2 cm laceration right parietal scalp. Right Ear: External ear normal.      Left Ear: External ear normal.      Nose: Nose normal.      Mouth/Throat:      Mouth: Mucous membranes are moist.   Eyes:      Conjunctiva/sclera: Conjunctivae normal.      Pupils: Pupils are equal, round, and reactive to light. Cardiovascular:      Rate and Rhythm: Regular rhythm. Tachycardia present. Heart sounds: No murmur heard. Pulmonary:      Effort: Pulmonary effort is normal. No respiratory distress. Breath sounds: Normal breath sounds. No stridor. No wheezing, rhonchi or rales. Abdominal:      General: Bowel sounds are normal. There is no distension. Palpations: Abdomen is soft. Tenderness: There is no abdominal tenderness. There is no guarding. Musculoskeletal:         General: Normal range of motion. Cervical back: Normal range of motion and neck supple. No tenderness. Skin:     General: Skin is warm and dry. Neurological:      Mental Status: He is alert. Mental status is at baseline. GCS: GCS eye subscore is 4. GCS verbal subscore is 1. GCS motor subscore is 5. Procedures     MDM     ED Course as of 06/25/22 0636   Sat Jun 25, 2022   8383 Laceration Repair Procedure Note    Indication: Laceration    Procedure: The patient was placed in the appropriate position.                                                                      The area was then irrigated with normal saline. The laceration was closed with staples. There were no additional lacerations requiring repair. The wound area was then dressed with a bandage. Minimal debridement was preformed, flaps were aligned. No foreign body was identified. Total repaired wound length: 2 cm. Other Items: Staple count: 3    The patient tolerated the procedure well. Complications: None     [MM]      ED Course User Index  [MM] Yuki Thomas DO     EKG:  Atrial fibrillation with ventricular rate of 88. MI interval, QRS duration and QT interval within normal range. Normal axis. Nonspecific ST segment and T wave changes. No previous EKG. ED Course as of 06/25/22 0645   Sat Jun 25, 2022   8904 Laceration Repair Procedure Note    Indication: Laceration    Procedure: The patient was placed in the appropriate position. The area was then irrigated with normal saline. The laceration was closed with staples. There were no additional lacerations requiring repair. The wound area was then dressed with a bandage. Minimal debridement was preformed, flaps were aligned. No foreign body was identified. Total repaired wound length: 2 cm. Other Items: Staple count: 3    The patient tolerated the procedure well. Complications: None     [MM]      ED Course User Index  [MM] Yuki Thomas DO       --------------------------------------------- PAST HISTORY ---------------------------------------------  Past Medical History:  has a past medical history of Altered mental status, Alzheimer's dementia ([facility] [address] Anemia, Arthritis, Chronic kidney failure, Dementia ([facility] [address] Dysphagia, GERD (gastroesophageal reflux disease), Hyperlipidemia, Hypertension, Malnutrition ([facility] [address] Parkinson disease ([facility] and Skin cancer. Past Surgical History:  has a past surgical history that includes hernia repair; Hemorrhoid surgery;  Nose surgery; and Endoscopy, colon, diagnostic. Social History:  reports that he has never smoked. He has never used smokeless tobacco. He reports that he does not drink alcohol and does not use drugs. Family History: family history includes Diabetes in his mother; Mental Illness in his father and sister. The patients home medications have been reviewed. Allergies: No known allergies    -------------------------------------------------- RESULTS -------------------------------------------------  Labs:  Results for orders placed or performed during the hospital encounter of 06/25/22   CBC   Result Value Ref Range    WBC 18.7 (H) 4.5 - 11.5 E9/L    RBC 4.86 3.80 - 5.80 E12/L    Hemoglobin 15.7 12.5 - 16.5 g/dL    Hematocrit 46.9 37.0 - 54.0 %    MCV 96.5 80.0 - 99.9 fL    MCH 32.3 26.0 - 35.0 pg    MCHC 33.5 32.0 - 34.5 %    RDW 13.8 11.5 - 15.0 fL    Platelets 942 587 - 294 E9/L    MPV 11.4 7.0 - 12.0 fL   Basic Metabolic Panel   Result Value Ref Range    Sodium 142 132 - 146 mmol/L    Potassium 2.9 (L) 3.5 - 5.0 mmol/L    Chloride 117 (H) 98 - 107 mmol/L    CO2 16 (L) 22 - 29 mmol/L    Anion Gap 9 7 - 16 mmol/L    Glucose 64 (L) 74 - 99 mg/dL    BUN 14 6 - 23 mg/dL    CREATININE 0.4 (L) 0.7 - 1.2 mg/dL    GFR Non-African American >60 >=60 mL/min/1.73    GFR African American >60     Calcium 4.6 (LL) 8.6 - 10.2 mg/dL   CK   Result Value Ref Range    Total CK 38 20 - 200 U/L   Troponin   Result Value Ref Range    Troponin, High Sensitivity 21 (H) 0 - 11 ng/L   SPECIMEN REJECTION   Result Value Ref Range    Rejected Test BMP/CK/TRP5     Reason for Rejection see below    EKG 12 Lead   Result Value Ref Range    Ventricular Rate 88 BPM    Atrial Rate 73 BPM    QRS Duration 60 ms    Q-T Interval 374 ms    QTc Calculation (Bazett) 452 ms    R Axis -9 degrees    T Axis -5 degrees       Radiology:  CT HEAD WO CONTRAST   Final Result   No acute intracranial abnormality.       There is gross distortion of the normal anatomic planes with motion artifact. However, no gross intracranial hemorrhage or changes could be identified. CT CERVICAL SPINE WO CONTRAST   Final Result   No acute abnormality of the cervical spine. XR PELVIS (1-2 VIEWS)   Final Result   Osteopenia with mild osteoarthritic changes of the hips. No gross fracture or subluxation identified. XR CHEST PORTABLE   Final Result   The head /neck overlie the left hemithorax. However, there is no gross   infiltrate or effusion on the left. The right hemithorax is well visualized and clear. ------------------------- NURSING NOTES AND VITALS REVIEWED ---------------------------  Date / Time Roomed:  6/25/2022  4:31 AM  ED Bed Assignment:  06/06    The nursing notes within the ED encounter and vital signs as below have been reviewed. /82   Pulse 85   Temp 97.6 °F (36.4 °C) (Axillary)   Resp 14   Ht 5' 7\" (1.702 m)   Wt 135 lb 11 oz (61.5 kg)   SpO2 98%   BMI 21.25 kg/m²   Oxygen Saturation Interpretation: Normal      ------------------------------------------ PROGRESS NOTES ------------------------------------------  I have spoken with the patient and discussed todays results, in addition to providing specific details for the plan of care and counseling regarding the diagnosis and prognosis. Their questions are answered at this time and they are agreeable with the plan. I discussed at length with them reasons for immediate return here for re evaluation. They will followup with primary care by calling their office tomorrow. --------------------------------- ADDITIONAL PROVIDER NOTES ---------------------------------  At this time the patient is without objective evidence of an acute process requiring hospitalization or inpatient management. They have remained hemodynamically stable throughout their entire ED visit and are stable for discharge with outpatient follow-up.      The plan has been discussed in detail and they are aware of the specific conditions for emergent return, as well as the importance of follow-up. New Prescriptions    No medications on file       Diagnosis:  1. Laceration of scalp, initial encounter    2. Hypokalemia    3. Hypocalcemia        Disposition:  Patient's disposition: Discharge to home  Patient's condition is stable. ATTENDING PROVIDER ATTESTATION:     I have personally performed and/or participated in the history, exam, medical decision making, and procedures and agree with all pertinent clinical information. I have also reviewed and agree with the past medical, family and social history unless otherwise noted. I have discussed this patient in detail with the resident, and provided the instruction and education regarding laceration repair. My findings/Plan: Scalp laceration repaired under my direct supervision and without complication.          [facility], DO  06/25/22 Mandy , DO  06/25/22 [id number]

## 2022-06-26 LAB
EKG ATRIAL RATE: 73 BPM
EKG Q-T INTERVAL: 374 MS
EKG QRS DURATION: 60 MS
EKG QTC CALCULATION (BAZETT): 452 MS
EKG R AXIS: -9 DEGREES
EKG T AXIS: -5 DEGREES
EKG VENTRICULAR RATE: 88 BPM

## 2022-07-04 ENCOUNTER — APPOINTMENT (OUTPATIENT)
Dept: GENERAL RADIOLOGY | Age: 87
End: 2022-07-04
Payer: MEDICARE

## 2022-07-04 ENCOUNTER — HOSPITAL ENCOUNTER (EMERGENCY)
Age: 87
Discharge: NURSING, REHAB OR LONG-TERM CARE | End: 2022-07-04
Attending: STUDENT IN AN ORGANIZED HEALTH CARE EDUCATION/TRAINING PROGRAM
Payer: MEDICARE

## 2022-07-04 ENCOUNTER — APPOINTMENT (OUTPATIENT)
Dept: CT IMAGING | Age: 87
End: 2022-07-04
Payer: MEDICARE

## 2022-07-04 VITALS
DIASTOLIC BLOOD PRESSURE: 78 MMHG | HEART RATE: 79 BPM | SYSTOLIC BLOOD PRESSURE: 166 MMHG | OXYGEN SATURATION: 100 % | RESPIRATION RATE: 15 BRPM | WEIGHT: 136 LBS | BODY MASS INDEX: 21.3 KG/M2 | TEMPERATURE: 99.7 F

## 2022-07-04 DIAGNOSIS — K94.29 IRRITATION AROUND PERCUTANEOUS ENDOSCOPIC GASTROSTOMY (PEG) TUBE SITE (HCC): Primary | ICD-10-CM

## 2022-07-04 DIAGNOSIS — K59.00 CONSTIPATION, UNSPECIFIED CONSTIPATION TYPE: ICD-10-CM

## 2022-07-04 LAB
ALBUMIN SERPL-MCNC: 4.5 G/DL (ref 3.5–5.2)
ALP BLD-CCNC: 135 U/L (ref 40–129)
ALT SERPL-CCNC: 14 U/L (ref 0–40)
ANION GAP SERPL CALCULATED.3IONS-SCNC: 13 MMOL/L (ref 7–16)
AST SERPL-CCNC: 15 U/L (ref 0–39)
BACTERIA: ABNORMAL /HPF
BASOPHILS ABSOLUTE: 0.05 E9/L (ref 0–0.2)
BASOPHILS RELATIVE PERCENT: 0.4 % (ref 0–2)
BILIRUB SERPL-MCNC: 0.5 MG/DL (ref 0–1.2)
BILIRUBIN URINE: NEGATIVE
BLOOD, URINE: NEGATIVE
BUN BLDV-MCNC: 28 MG/DL (ref 6–23)
CALCIUM SERPL-MCNC: 10.2 MG/DL (ref 8.6–10.2)
CHLORIDE BLD-SCNC: 98 MMOL/L (ref 98–107)
CLARITY: CLEAR
CO2: 30 MMOL/L (ref 22–29)
COLOR: YELLOW
CREAT SERPL-MCNC: 0.9 MG/DL (ref 0.7–1.2)
EOSINOPHILS ABSOLUTE: 0.03 E9/L (ref 0.05–0.5)
EOSINOPHILS RELATIVE PERCENT: 0.2 % (ref 0–6)
EPITHELIAL CELLS, UA: ABNORMAL /HPF
GFR AFRICAN AMERICAN: >60
GFR NON-AFRICAN AMERICAN: >60 ML/MIN/1.73
GLUCOSE BLD-MCNC: 108 MG/DL (ref 74–99)
GLUCOSE URINE: NEGATIVE MG/DL
HCT VFR BLD CALC: 48.7 % (ref 37–54)
HEMOGLOBIN: 16.3 G/DL (ref 12.5–16.5)
IMMATURE GRANULOCYTES #: 0.04 E9/L
IMMATURE GRANULOCYTES %: 0.3 % (ref 0–5)
KETONES, URINE: NEGATIVE MG/DL
LACTIC ACID, SEPSIS: 1.3 MMOL/L (ref 0.5–1.9)
LACTIC ACID, SEPSIS: 2 MMOL/L (ref 0.5–1.9)
LEUKOCYTE ESTERASE, URINE: NEGATIVE
LIPASE: 24 U/L (ref 13–60)
LYMPHOCYTES ABSOLUTE: 0.7 E9/L (ref 1.5–4)
LYMPHOCYTES RELATIVE PERCENT: 5.3 % (ref 20–42)
MCH RBC QN AUTO: 31.8 PG (ref 26–35)
MCHC RBC AUTO-ENTMCNC: 33.5 % (ref 32–34.5)
MCV RBC AUTO: 95.1 FL (ref 80–99.9)
MONOCYTES ABSOLUTE: 0.69 E9/L (ref 0.1–0.95)
MONOCYTES RELATIVE PERCENT: 5.2 % (ref 2–12)
NEUTROPHILS ABSOLUTE: 11.67 E9/L (ref 1.8–7.3)
NEUTROPHILS RELATIVE PERCENT: 88.6 % (ref 43–80)
NITRITE, URINE: NEGATIVE
PDW BLD-RTO: 13.4 FL (ref 11.5–15)
PH UA: 7.5 (ref 5–9)
PLATELET # BLD: 349 E9/L (ref 130–450)
PMV BLD AUTO: 11 FL (ref 7–12)
POTASSIUM REFLEX MAGNESIUM: 4.2 MMOL/L (ref 3.5–5)
PROTEIN UA: NEGATIVE MG/DL
RBC # BLD: 5.12 E12/L (ref 3.8–5.8)
RBC UA: ABNORMAL /HPF (ref 0–2)
SODIUM BLD-SCNC: 141 MMOL/L (ref 132–146)
SPECIFIC GRAVITY UA: 1.01 (ref 1–1.03)
TOTAL PROTEIN: 8.3 G/DL (ref 6.4–8.3)
UROBILINOGEN, URINE: 1 E.U./DL
WBC # BLD: 13.2 E9/L (ref 4.5–11.5)
WBC UA: ABNORMAL /HPF (ref 0–5)

## 2022-07-04 PROCEDURE — 74177 CT ABD & PELVIS W/CONTRAST: CPT

## 2022-07-04 PROCEDURE — 87088 URINE BACTERIA CULTURE: CPT

## 2022-07-04 PROCEDURE — 83605 ASSAY OF LACTIC ACID: CPT

## 2022-07-04 PROCEDURE — 71045 X-RAY EXAM CHEST 1 VIEW: CPT

## 2022-07-04 PROCEDURE — 87040 BLOOD CULTURE FOR BACTERIA: CPT

## 2022-07-04 PROCEDURE — 83690 ASSAY OF LIPASE: CPT

## 2022-07-04 PROCEDURE — 80053 COMPREHEN METABOLIC PANEL: CPT

## 2022-07-04 PROCEDURE — 85025 COMPLETE CBC W/AUTO DIFF WBC: CPT

## 2022-07-04 PROCEDURE — 87150 DNA/RNA AMPLIFIED PROBE: CPT

## 2022-07-04 PROCEDURE — 2580000003 HC RX 258: Performed by: STUDENT IN AN ORGANIZED HEALTH CARE EDUCATION/TRAINING PROGRAM

## 2022-07-04 PROCEDURE — 36415 COLL VENOUS BLD VENIPUNCTURE: CPT

## 2022-07-04 PROCEDURE — 6370000000 HC RX 637 (ALT 250 FOR IP): Performed by: STUDENT IN AN ORGANIZED HEALTH CARE EDUCATION/TRAINING PROGRAM

## 2022-07-04 PROCEDURE — 6360000004 HC RX CONTRAST MEDICATION: Performed by: RADIOLOGY

## 2022-07-04 PROCEDURE — 81001 URINALYSIS AUTO W/SCOPE: CPT

## 2022-07-04 PROCEDURE — 99285 EMERGENCY DEPT VISIT HI MDM: CPT

## 2022-07-04 RX ORDER — POLYETHYLENE GLYCOL 3350 17 G/17G
17 POWDER, FOR SOLUTION ORAL DAILY
COMMUNITY

## 2022-07-04 RX ORDER — BACITRACIN 500 [USP'U]/G
OINTMENT TOPICAL ONCE
Status: COMPLETED | OUTPATIENT
Start: 2022-07-04 | End: 2022-07-04

## 2022-07-04 RX ORDER — CEFDINIR 300 MG/1
300 CAPSULE ORAL 2 TIMES DAILY
COMMUNITY
Start: 2022-07-03 | End: 2022-07-13

## 2022-07-04 RX ORDER — POTASSIUM CHLORIDE 20MEQ/15ML
LIQUID (ML) ORAL 3 TIMES DAILY
COMMUNITY

## 2022-07-04 RX ORDER — PANTOPRAZOLE SODIUM 40 MG/1
40 GRANULE, DELAYED RELEASE ORAL
COMMUNITY

## 2022-07-04 RX ORDER — 0.9 % SODIUM CHLORIDE 0.9 %
500 INTRAVENOUS SOLUTION INTRAVENOUS ONCE
Status: COMPLETED | OUTPATIENT
Start: 2022-07-04 | End: 2022-07-04

## 2022-07-04 RX ADMIN — IOPAMIDOL 50 ML: 755 INJECTION, SOLUTION INTRAVENOUS at 03:59

## 2022-07-04 RX ADMIN — SODIUM CHLORIDE 500 ML: 9 INJECTION, SOLUTION INTRAVENOUS at 04:11

## 2022-07-04 RX ADMIN — RUGBY ZINC OXIDE 20%: 20 OINTMENT TOPICAL at 08:08

## 2022-07-04 ASSESSMENT — PAIN - FUNCTIONAL ASSESSMENT: PAIN_FUNCTIONAL_ASSESSMENT: NONE - DENIES PAIN

## 2022-07-04 NOTE — ED NOTES
Pt comes in for possible peg tube infection -- pt has redness and drainage to the PEG tube site and was sent in from [facility]. Pt was on omnicef at the SNF, but they could not tell us when last dose was given. Pt is nonverbal and has parkinson's at baseline. PEG tube has redness surrounding site and has some purulent drainage. Pt was also cleaned up at this time after incontinent bowel movement. Pt is on monitor and in NAD.  Contracted limbs at baseline, as well     ora JEANMARIE Vasques  07/04/22 4010

## 2022-07-04 NOTE — ED NOTES
IV established to right upper arm -- blood work drawn, as well as one set of blood cultures at this time     Mk King RN  07/04/22 [id number]

## 2022-07-04 NOTE — ED PROVIDER NOTES
Hunter Rg is a 82 y.o. male with a PMHx significant for Parkinson's dementia, GERD, dysphagia, HLD, CKD, HTN,  who presents for evaluation of PEG tube problem, beginning prior to arrival.  The complaint has been persistent, moderate in severity, and worsened by nothing. The patient is non-verbal.  Per facility, the PEG tube site has been having redness and drainage. He is currently on omnicef, but has only had one dose. The history is provided by the EMS personnel and medical records. Review of Systems   Unable to perform ROS: Patient nonverbal        Physical Exam  Vitals and nursing note reviewed. Constitutional:       General: He is not in acute distress. Appearance: Normal appearance. He is well-developed. He is ill-appearing (chronic contractures). HENT:      Head: Normocephalic and atraumatic. Eyes:      Pupils: Pupils are equal, round, and reactive to light. Cardiovascular:      Rate and Rhythm: Normal rate and regular rhythm. Heart sounds: Normal heart sounds. No murmur heard. Pulmonary:      Effort: Pulmonary effort is normal. No respiratory distress. Breath sounds: Normal breath sounds. No stridor. Abdominal:      General: There is no distension. Palpations: Abdomen is soft. There is no mass. Tenderness: There is no abdominal tenderness. Musculoskeletal:         General: No swelling or tenderness. Cervical back: Normal range of motion and neck supple. Skin:     General: Skin is warm and dry. Coloration: Skin is not jaundiced or pale. Neurological:      Mental Status: He is alert. Comments: Non-verbal  Chronic contractures          Procedures     MDM     ED Course as of 07/04/22 2147 Mon Jul 04, 2022   0700 Spoke with Dr. Panchal Lobe discussed the findings and lab results. Patient with slight leukocytosis. States to order a zinc oxide cream to place around the patient and continue omnicef.   Place bumper to 3 or tighter. [BB] 0444 Informed by nursing staff that the patient tolerated Enema well with lots of stool being produced. [BB]      ED Course User Index  [BB] DO Michaela Martins presents to the ED for evaluation of irritation at pegtube site. Workup in the ED revealed purulent drainage and ulceration near pegtube site. Patient had recently been placed on Omnicef and only received 1 dose. On arrival patient is afebrile, not tachycardic. Initial lactic acid of 2 which improved after some IVF. CT scan demonstrating no evidence of tracking or abscess. There was diffuse colonic retention. He did have abdominal prior to CT scan, he was given an enema and had a large, and afterwards as well. Spoke with general surgery in regards to the irritation, states typically Omnicef does well for the patient as well as cream like zinc oxide. Zinc oxide was ordered for the patient, he was sent back to nursing facility. Patient continues to be non-toxic on re-evaluation. Findings were discussed with the patient and reasons to immediately return to the ED were articulated to them. They will follow-up with their PCP.    --------------------------------------------- PAST HISTORY ---------------------------------------------  Past Medical History:  has a past medical history of Altered mental status, Alzheimer's dementia ([facility] [address] Anemia, Arthritis, Chronic kidney failure, Dementia ([facility] Dysphagia, GERD (gastroesophageal reflux disease), Hyperlipidemia, Hypertension, Malnutrition ([facility] [address] Parkinson disease ([facility] [address] and Skin cancer. Past Surgical History:  has a past surgical history that includes hernia repair; Hemorrhoid surgery; Nose surgery; and Endoscopy, colon, diagnostic. Social History:  reports that he has never smoked. He has never used smokeless tobacco. He reports that he does not drink alcohol and does not use drugs.     Family History: family history includes Diabetes in his mother; Mental Illness in his father and sister. The patients home medications have been reviewed.     Allergies: No known allergies    -------------------------------------------------- RESULTS -------------------------------------------------  Labs:  Results for orders placed or performed during the hospital encounter of 07/04/22   CBC with Auto Differential   Result Value Ref Range    WBC 13.2 (H) 4.5 - 11.5 E9/L    RBC 5.12 3.80 - 5.80 E12/L    Hemoglobin 16.3 12.5 - 16.5 g/dL    Hematocrit 48.7 37.0 - 54.0 %    MCV 95.1 80.0 - 99.9 fL    MCH 31.8 26.0 - 35.0 pg    MCHC 33.5 32.0 - 34.5 %    RDW 13.4 11.5 - 15.0 fL    Platelets 399 032 - 550 E9/L    MPV 11.0 7.0 - 12.0 fL    Neutrophils % 88.6 (H) 43.0 - 80.0 %    Immature Granulocytes % 0.3 0.0 - 5.0 %    Lymphocytes % 5.3 (L) 20.0 - 42.0 %    Monocytes % 5.2 2.0 - 12.0 %    Eosinophils % 0.2 0.0 - 6.0 %    Basophils % 0.4 0.0 - 2.0 %    Neutrophils Absolute 11.67 (H) 1.80 - 7.30 E9/L    Immature Granulocytes # 0.04 E9/L    Lymphocytes Absolute 0.70 (L) 1.50 - 4.00 E9/L    Monocytes Absolute 0.69 0.10 - 0.95 E9/L    Eosinophils Absolute 0.03 (L) 0.05 - 0.50 E9/L    Basophils Absolute 0.05 0.00 - 0.20 E9/L   Comprehensive Metabolic Panel w/ Reflex to MG   Result Value Ref Range    Sodium 141 132 - 146 mmol/L    Potassium reflex Magnesium 4.2 3.5 - 5.0 mmol/L    Chloride 98 98 - 107 mmol/L    CO2 30 (H) 22 - 29 mmol/L    Anion Gap 13 7 - 16 mmol/L    Glucose 108 (H) 74 - 99 mg/dL    BUN 28 (H) 6 - 23 mg/dL    CREATININE 0.9 0.7 - 1.2 mg/dL    GFR Non-African American >60 >=60 mL/min/1.73    GFR African American >60     Calcium 10.2 8.6 - 10.2 mg/dL    Total Protein 8.3 6.4 - 8.3 g/dL    Albumin 4.5 3.5 - 5.2 g/dL    Total Bilirubin 0.5 0.0 - 1.2 mg/dL    Alkaline Phosphatase 135 (H) 40 - 129 U/L    ALT 14 0 - 40 U/L    AST 15 0 - 39 U/L   Lactate, Sepsis   Result Value Ref Range    Lactic Acid, Sepsis 2.0 (H) 0.5 - 1.9 mmol/L   Lactate, Sepsis   Result Value Ref Range    Lactic Acid, Sepsis 1.3 0.5

## 2022-07-04 NOTE — ED NOTES
Pt back from CT with this RN -- pt cleaned up due to gown being wet from drool. Pt has multiple excoriations on left inner elbow, left armpit, left shoulder and left cheek. Wiped off with wet wipes and dried well with towels. Towel placed under left cheek to prevent continual moisture. Pt readjusted in bed and given warm blankets, resting more comfortably.  Remains on monitor     Cordell Fabry, RN  07/04/22 3791

## 2022-07-04 NOTE — ED NOTES
Patient resting comfortably in bed at this time with eyes closed - breathing unlabored and equal and patient in no apparent distress. Call light in reach, side rails up, and bed in lowest position. Patient states no other needs or complaints at this time.        Jay Post RN  07/04/22 9470

## 2022-07-05 LAB
ACINETOBACTER CALCOAC BAUMANNII COMPLEX BY PCR: NOT DETECTED
BACTEROIDES FRAGILIS BY PCR: NOT DETECTED
BOTTLE TYPE: ABNORMAL
CANDIDA ALBICANS BY PCR: NOT DETECTED
CANDIDA AURIS BY PCR: NOT DETECTED
CANDIDA GLABRATA BY PCR: NOT DETECTED
CANDIDA KRUSEI BY PCR: NOT DETECTED
CANDIDA PARAPSILOSIS BY PCR: NOT DETECTED
CANDIDA TROPICALIS BY PCR: NOT DETECTED
CRYPTOCOCCUS NEOFORMANS/GATTII BY PCR: NOT DETECTED
ENTEROBACTER CLOACAE COMPLEX BY PCR: NOT DETECTED
ENTEROBACTERALES BY PCR: NOT DETECTED
ENTEROCOCCUS FAECALIS BY PCR: NOT DETECTED
ENTEROCOCCUS FAECIUM BY PCR: NOT DETECTED
ESCHERICHIA COLI BY PCR: NOT DETECTED
HAEMOPHILUS INFLUENZAE BY PCR: NOT DETECTED
KLEBSIELLA AEROGENES BY PCR: NOT DETECTED
KLEBSIELLA OXYTOCA BY PCR: NOT DETECTED
KLEBSIELLA PNEUMONIAE GROUP BY PCR: NOT DETECTED
LISTERIA MONOCYTOGENES BY PCR: NOT DETECTED
METHICILLIN RESISTANCE MECA/C  BY PCR: DETECTED
NEISSERIA MENINGITIDIS BY PCR: NOT DETECTED
ORDER NUMBER: ABNORMAL
PROTEUS SPECIES BY PCR: NOT DETECTED
PSEUDOMONAS AERUGINOSA BY PCR: NOT DETECTED
SALMONELLA SPECIES BY PCR: NOT DETECTED
SERRATIA MARCESCENS BY PCR: NOT DETECTED
SOURCE OF BLOOD CULTURE: ABNORMAL
STAPHYLOCOCCUS AUREUS BY PCR: NOT DETECTED
STAPHYLOCOCCUS EPIDERMIDIS BY PCR: DETECTED
STAPHYLOCOCCUS LUGDUNENSIS BY PCR: NOT DETECTED
STAPHYLOCOCCUS SPECIES BY PCR: DETECTED
STENOTROPHOMONAS MALTOPHILIA BY PCR: NOT DETECTED
STREPTOCOCCUS AGALACTIAE BY PCR: NOT DETECTED
STREPTOCOCCUS PNEUMONIAE BY PCR: NOT DETECTED
STREPTOCOCCUS PYOGENES  BY PCR: NOT DETECTED
STREPTOCOCCUS SPECIES BY PCR: NOT DETECTED

## 2022-07-06 LAB — URINE CULTURE, ROUTINE: NORMAL

## 2022-07-08 LAB
BLOOD CULTURE, ROUTINE: ABNORMAL
ORGANISM: ABNORMAL

## 2022-07-09 LAB — CULTURE, BLOOD 2: NORMAL

## 2022-09-16 ENCOUNTER — HOSPITAL ENCOUNTER (EMERGENCY)
Age: 87
Discharge: HOME | End: 2022-09-17
Attending: EMERGENCY MEDICINE
Payer: MEDICARE

## 2022-09-16 DIAGNOSIS — Z93.1 STATUS POST INSERTION OF PERCUTANEOUS ENDOSCOPIC GASTROSTOMY (PEG) TUBE (HCC): Primary | ICD-10-CM

## 2022-09-16 PROCEDURE — 99283 EMERGENCY DEPT VISIT LOW MDM: CPT

## 2022-09-16 PROCEDURE — 43762 RPLC GTUBE NO REVJ TRC: CPT

## 2022-09-17 ENCOUNTER — APPOINTMENT (OUTPATIENT)
Dept: GENERAL RADIOLOGY | Age: 87
End: 2022-09-17
Payer: MEDICARE

## 2022-09-17 VITALS
OXYGEN SATURATION: 97 % | DIASTOLIC BLOOD PRESSURE: 100 MMHG | TEMPERATURE: 98.5 F | HEART RATE: 78 BPM | RESPIRATION RATE: 18 BRPM | SYSTOLIC BLOOD PRESSURE: 138 MMHG

## 2022-09-17 PROCEDURE — 74018 RADEX ABDOMEN 1 VIEW: CPT

## 2022-09-17 NOTE — ED PROVIDER NOTES
Department of Emergency Medicine   ED  Provider Note  Admit Date/RoomTime: 9/16/2022 10:57 PM  ED Room: 02/02 9/17/22    Chief Complaint:  G-tube replacement    History of Present Illness:  Source of history provided by:  past medical records. History/Exam Limitations: mental status, confusion, and dementia. Chelita Morales is a 82 y.o. old male who  has a past medical history of Altered mental status, Alzheimer's dementia ([address] Anemia, Arthritis, Chronic kidney failure, Dementia ([address] Dysphagia, GERD (gastroesophageal reflux disease), Hyperlipidemia, Hypertension, Malnutrition ([address] Parkinson disease ([address] and Skin cancer. , presents to the emergency department from skilled nursing, for PEG tube evaluation due to dislodgement, which occured 4 hour(s) prior to arrival.  He has a permanent Gastrostomy tube and this tube has been in place for quite some time. It is not new. The patient has no associated symptoms. Review of Systems:      Please note that the ROS and Physical Examination are limited due to this patients confusion, dementia, and chronic condition and communication barrier. Pertinent positives and negatives are stated within HPI, all other systems reviewed and are negative. Past Surgical History:  has a past surgical history that includes hernia repair; Hemorrhoid surgery; Nose surgery; and Endoscopy, colon, diagnostic. Social History:  reports that he has never smoked. He has never used smokeless tobacco. He reports that he does not drink alcohol and does not use drugs. Family History: family history includes Diabetes in his mother; Mental Illness in his father and sister. Allergies: No known allergies    Physical Exam:  Vital signs reviewed. Constitutional:  Alert, development consistent with age. Neck:  Normal ROM. Supple.   Lungs:  Clear to auscultation and breath sounds equal.    CV: Regular rate and rhythm, normal heart sounds, without pathological murmurs, ectopy, gallops, or rubs. Abdomen:   soft, nontender, with good bowel sounds. No firm or pulsatile mass. Open area from where G-tube is missing. Site is well appearing, no evidence of infection,  Skin:  No rashes, erythema present. Lymphatics: No lymphangitis or adenopathy noted. Neurological:  At neurologic baseline.    -------------------Nursing Notes / Prior Records & Vitals Reviewed Section----------------------   (The nursing notes within the ED encounter, home medications, current encounter or past encounter records and vital signs as below have been reviewed)   BP (!) 138/100   Pulse 77   Temp 98.5 °F (36.9 °C) (Axillary)   Resp 18   SpO2 97%   Oxygen Saturation Interpretation: Normal.  -------------------------------------------Test Results Section---------------------------------------------  (All laboratory and radiology results have been personally reviewed by myself)  Laboratory:  No results found for this visit on 09/16/22. Radiology: All Radiology results interpreted by Radiologist unless otherwise noted. No orders to display     -----------------------------ED Course / Medical Decision Making Section--------------------------  ED Course Medications:  Medications - No data to display           Time: ***    FEEDING TUBE REPLACEMENT  Unless otherwise indicated, this procedure was done or directly supervised by the ED attending. Risks, benefits and alternatives (for applicable procedures below) described. Performed By: Ad Crespo MD    Indication: Tube fell out. Informed consent obtained: yes   Procedure: A feeding tube was replaced using a 20 [place] gastrostomy tube and the bulb was inflated using 10 cc of sterile water. Tube was secured to skin without sutures. Post-Procedure: Tube position confirmed by x-ray with contrast injection. Patient tolerated the procedure well. Complications:  None.           Medical Decision Making:  G-tube dislodgement, easily replaced. Xray confirms placement. I have spoken with the SNF and discussed todays results, in addition to providing specific details for the plan of care and counseling regarding the diagnosis and prognosis. Their questions are answered at this time and they are agreeable with the plan.  ------------------------------------Impression & Disposition Section------------------------------------  Impression:  Attention to G-tube  G-tube replacement    Disposition:  Disposition: Discharge to nursing home  Patient condition is stable    NOTE: This report was transcribed using voice recognition software. Every effort was made to ensure accuracy; however, inadvertent computerized transcription errors may be present.

## 2022-10-18 NOTE — ED PROVIDER NOTES
Department of Emergency Medicine   ED  Provider Note  Admit Date/RoomTime: 9/16/2022 10:57 PM  ED Room: OFE/OFE            9/17/22    Chief Complaint:  G-tube replacement    History of Present Illness:  Source of history provided by:  past medical records. History/Exam Limitations: mental status, confusion, and dementia. Merlin Shaw is a 82 y.o. old male who  has a past medical history of Altered mental status, Alzheimer's dementia ([address] Anemia, Arthritis, Chronic kidney failure, Dementia ([address] Dysphagia, GERD (gastroesophageal reflux disease), Hyperlipidemia, Hypertension, Malnutrition ([address] Parkinson disease ([address] and Skin cancer. , presents to the emergency department from skilled nursing, for PEG tube evaluation due to dislodgement, which occured 4 hour(s) prior to arrival.  He has a permanent Gastrostomy tube and this tube has been in place for quite some time. It is not new. The patient has no associated symptoms. Review of Systems:      Please note that the ROS and Physical Examination are limited due to this patients confusion, dementia, and chronic condition and communication barrier. Pertinent positives and negatives are stated within HPI, all other systems reviewed and are negative. Past Surgical History:  has a past surgical history that includes hernia repair; Hemorrhoid surgery; Nose surgery; and Endoscopy, colon, diagnostic. Social History:  reports that he has never smoked. He has never used smokeless tobacco. He reports that he does not drink alcohol and does not use drugs. Family History: family history includes Diabetes in his mother; Mental Illness in his father and sister. Allergies: No known allergies    Physical Exam:  Vital signs reviewed. Constitutional:  Alert, development consistent with age. Neck:  Normal ROM. Supple.   Lungs:  Clear to auscultation and breath sounds equal.    CV: Regular rate and rhythm, normal heart sounds, without pathological murmurs, ectopy, gallops, or rubs. Abdomen:   soft, nontender, with good bowel sounds. No firm or pulsatile mass. Open area from where G-tube is missing. Site is well appearing, no evidence of infection,  Skin:  No rashes, erythema present. Lymphatics: No lymphangitis or adenopathy noted. Neurological:  At neurologic baseline.    -------------------Nursing Notes / Prior Records & Vitals Reviewed Section----------------------   (The nursing notes within the ED encounter, home medications, current encounter or past encounter records and vital signs as below have been reviewed)   BP (!) 138/100   Pulse 78   Temp 98.5 °F (36.9 °C) (Axillary)   Resp 18   SpO2 97%   Oxygen Saturation Interpretation: Normal.  -------------------------------------------Test Results Section---------------------------------------------  (All laboratory and radiology results have been personally reviewed by myself)  Laboratory:  No results found for this visit on 09/16/22. Radiology: All Radiology results interpreted by Radiologist unless otherwise noted. XR ABDOMEN (KUB) (SINGLE AP VIEW)   Final Result   Colonic constipation. PEG tube tip is  in good position. Racheal Wilkinson RECOMM ENDATION:                   Medical Decision Making:  G-tube dislodgement, easily replaced. Xray confirms placement. I have spoken with the SNF and discussed todays results, in addition to providing specific details for the plan of care and counseling regarding the diagnosis and prognosis. Their questions are answered at this time and they are agreeable with the plan.  ------------------------------------Impression & Disposition Section------------------------------------  Impression:  Attention to G-tube  G-tube replacement    Disposition:  Disposition: Discharge to nursing home  Patient condition is stable    NOTE: This report was transcribed using voice recognition software.  Every effort was made to ensure accuracy; however, inadvertent computerized transcription errors may be present.       Tee Hogan MD  10/18/22 8995

## 2022-12-13 ENCOUNTER — HOSPITAL ENCOUNTER (EMERGENCY)
Age: 87
Discharge: HOME | End: 2022-12-14
Attending: EMERGENCY MEDICINE
Payer: MEDICARE

## 2022-12-13 VITALS
HEART RATE: 95 BPM | TEMPERATURE: 98.4 F | DIASTOLIC BLOOD PRESSURE: 98 MMHG | SYSTOLIC BLOOD PRESSURE: 157 MMHG | OXYGEN SATURATION: 99 % | RESPIRATION RATE: 18 BRPM

## 2022-12-13 DIAGNOSIS — K94.23 PEG TUBE MALFUNCTION (HCC): Primary | ICD-10-CM

## 2022-12-13 PROCEDURE — 99283 EMERGENCY DEPT VISIT LOW MDM: CPT

## 2022-12-13 ASSESSMENT — PAIN - FUNCTIONAL ASSESSMENT: PAIN_FUNCTIONAL_ASSESSMENT: NONE - DENIES PAIN

## 2022-12-14 ENCOUNTER — APPOINTMENT (OUTPATIENT)
Dept: GENERAL RADIOLOGY | Age: 87
End: 2022-12-14
Payer: MEDICARE

## 2022-12-14 PROCEDURE — 74018 RADEX ABDOMEN 1 VIEW: CPT

## 2022-12-14 NOTE — ED PROVIDER NOTES
Margaret Brand is an 82 y.o. male with hx of Alzheimer's, Parkinsons, and anemia       Patient is a 82 y.o. male presents with a chief complaint of PEG tube dislodged  This has been occurring for just today. HPI limited 2/2 pt nonverbal status. Pt brought to ED for concern for dislodged PEG tube. In ED, PEG tube appearing appropriately placed. Review of Systems   Reason unable to perform ROS: 2/2 pt nonverbal.      Physical Exam  Constitutional:       General: He is not in acute distress. Appearance: Normal appearance. HENT:      Head: Normocephalic and atraumatic. Mouth/Throat:      Mouth: Mucous membranes are moist.      Pharynx: Oropharynx is clear. Eyes:      Extraocular Movements: Extraocular movements intact. Conjunctiva/sclera: Conjunctivae normal.      Pupils: Pupils are equal, round, and reactive to light. Cardiovascular:      Rate and Rhythm: Normal rate and regular rhythm. Pulses: Normal pulses. Heart sounds: Normal heart sounds. Pulmonary:      Effort: Pulmonary effort is normal.      Breath sounds: Normal breath sounds. Abdominal:      General: Abdomen is flat. Palpations: Abdomen is soft. Comments: PEG tube in place with no surrounding edema, warmth, fluctuance   Musculoskeletal:         General: No swelling. Normal range of motion. Cervical back: Normal range of motion and neck supple. Skin:     General: Skin is warm and dry. Neurological:      General: No focal deficit present. Mental Status: He is oriented to person, place, and time. Psychiatric:         Mood and Affect: Mood normal.         Behavior: Behavior normal.        Procedures     MDM  Number of Diagnoses or Management Options  PEG tube malfunction ([facility]  Diagnosis management comments: Margaret Brand is an 82 y.o. male presenting to the ED with c/o PEG tube dislodgement. In ED, pt in NAD with stable vital signs. PEG tube appearing well positioned.  PEG tube was flushed well without difficulty. KUB with fluoroscopy revealing adequate positioning of PEG tip. Plan to discharge patient back to nursing facility discussed with patient                    --------------------------------------------- PAST HISTORY ---------------------------------------------  Past Medical History:  has a past medical history of Altered mental status, Alzheimer's dementia ([facility] Anemia, Arthritis, Chronic kidney failure, Dementia ([facility] Dysphagia, GERD (gastroesophageal reflux disease), Hyperlipidemia, Hypertension, Malnutrition ([facility] [address] Parkinson disease ([facility] and Skin cancer. Past Surgical History:  has a past surgical history that includes hernia repair; Hemorrhoid surgery; Nose surgery; and Endoscopy, colon, diagnostic. Social History:  reports that he has never smoked. He has never used smokeless tobacco. He reports that he does not drink alcohol and does not use drugs. Family History: family history includes Diabetes in his mother; Mental Illness in his father and sister. The patients home medications have been reviewed. Allergies: No known allergies    -------------------------------------------------- RESULTS -------------------------------------------------  Labs:  No results found for this visit on 12/13/22. Radiology:  XR ABDOMEN (KUB) (SINGLE AP VIEW)   Final Result   Adequate positioning of the PEG tip. Gastric reflux.             ------------------------- NURSING NOTES AND VITALS REVIEWED ---------------------------  Date / Time Roomed:  12/13/2022 10:38 PM  ED Bed Assignment:  [id number]/H1    The nursing notes within the ED encounter and vital signs as below have been reviewed.    BP (!) 157/98   Pulse 95   Temp 98.4 °F (36.9 °C) (Oral)   Resp 18   SpO2 99%   Oxygen Saturation Interpretation: Normal      ------------------------------------------ PROGRESS NOTES ------------------------------------------  2:30 AM EST  I have spoken with the patient and discussed todays results, in addition to providing specific details for the plan of care and counseling regarding the diagnosis and prognosis. I discussed at length with them reasons for immediate return here for re evaluation. They will followup with their primary care physician by calling their office tomorrow. --------------------------------- ADDITIONAL PROVIDER NOTES ---------------------------------  At this time the patient is without objective evidence of an acute process requiring hospitalization or inpatient management. They have remained hemodynamically stable throughout their entire ED visit and are stable for discharge with outpatient follow-up. The plan has been discussed in detail. New Prescriptions    No medications on file       Diagnosis:  1. PEG tube malfunction ([address]        Disposition:  Patient's disposition: Discharge to home  Patient's condition is stable. Patient was given return precautions. Imaging  was interpreted by me. Patient will follow up with their primary care provider. Patient is agreeable to this plan. Patient has remained stable throughout their stay in the ED. Patient was seen and evaluated by myself and my attending [facility]. Assessment and Plan discussed with attending provider, please see attestation for final plan of care. This note was done using dictation software and there may be some grammatical errors associated with this.     Kayli Croft 59, DO  Resident  12/14/22 6706

## 2022-12-14 NOTE — ED NOTES
Flushed peg tube with 50mL.  No resistance or complications at this time     Zeb Alanis RN  12/13/22 6961

## 2023-01-01 ENCOUNTER — HOSPITAL ENCOUNTER (INPATIENT)
Age: 88
LOS: 1 days | DRG: 871 | End: 2023-10-15
Attending: EMERGENCY MEDICINE | Admitting: INTERNAL MEDICINE
Payer: MEDICARE

## 2023-01-01 ENCOUNTER — APPOINTMENT (OUTPATIENT)
Dept: GENERAL RADIOLOGY | Age: 88
DRG: 871 | End: 2023-01-01
Payer: MEDICARE

## 2023-01-01 VITALS
SYSTOLIC BLOOD PRESSURE: 117 MMHG | WEIGHT: 140 LBS | BODY MASS INDEX: 21.93 KG/M2 | DIASTOLIC BLOOD PRESSURE: 49 MMHG | TEMPERATURE: 98 F | OXYGEN SATURATION: 72 %

## 2023-01-01 DIAGNOSIS — R79.89 ELEVATED LACTIC ACID LEVEL: ICD-10-CM

## 2023-01-01 DIAGNOSIS — J96.01 ACUTE RESPIRATORY FAILURE WITH HYPOXIA (HCC): ICD-10-CM

## 2023-01-01 DIAGNOSIS — R79.89 ELEVATED TROPONIN: ICD-10-CM

## 2023-01-01 DIAGNOSIS — I46.9 CARDIAC ARREST (HCC): Primary | ICD-10-CM

## 2023-01-01 DIAGNOSIS — R40.2432 GLASGOW COMA SCALE TOTAL SCORE 3-8, AT ARRIVAL TO EMERGENCY DEPARTMENT (HCC): ICD-10-CM

## 2023-01-01 DIAGNOSIS — R57.9 SHOCK (HCC): ICD-10-CM

## 2023-01-01 DIAGNOSIS — R06.03 RESPIRATORY DISTRESS: ICD-10-CM

## 2023-01-01 DIAGNOSIS — E87.29 HIGH ANION GAP METABOLIC ACIDOSIS: ICD-10-CM

## 2023-01-01 DIAGNOSIS — Z71.89 DNR (DO NOT RESUSCITATE) DISCUSSION: ICD-10-CM

## 2023-01-01 LAB
ALBUMIN SERPL-MCNC: 1.3 G/DL (ref 3.5–5.2)
ALBUMIN SERPL-MCNC: 1.5 G/DL (ref 3.5–5.2)
ALP SERPL-CCNC: 64 U/L (ref 40–129)
ALP SERPL-CCNC: 92 U/L (ref 40–129)
ALT SERPL-CCNC: 125 U/L (ref 0–40)
ALT SERPL-CCNC: 49 U/L (ref 0–40)
AMMONIA PLAS-SCNC: 209 UMOL/L (ref 16–60)
AMORPH SED URNS QL MICRO: PRESENT
ANION GAP SERPL CALCULATED.3IONS-SCNC: 14 MMOL/L (ref 7–16)
ANION GAP SERPL CALCULATED.3IONS-SCNC: 30 MMOL/L (ref 7–16)
AST SERPL-CCNC: 128 U/L (ref 0–39)
AST SERPL-CCNC: 310 U/L (ref 0–39)
B.E.: -13.1 MMOL/L (ref -3–3)
BASOPHILS # BLD: 0.02 K/UL (ref 0–0.2)
BASOPHILS NFR BLD: 0 % (ref 0–2)
BILIRUB SERPL-MCNC: 0.2 MG/DL (ref 0–1.2)
BILIRUB SERPL-MCNC: <0.2 MG/DL (ref 0–1.2)
BILIRUB UR QL STRIP: NEGATIVE
BNP SERPL-MCNC: ABNORMAL PG/ML (ref 0–450)
BUN SERPL-MCNC: 98 MG/DL (ref 6–23)
BUN SERPL-MCNC: 99 MG/DL (ref 6–23)
CALCIUM SERPL-MCNC: 15.2 MG/DL (ref 8.6–10.2)
CALCIUM SERPL-MCNC: 9 MG/DL (ref 8.6–10.2)
CHLORIDE SERPL-SCNC: 91 MMOL/L (ref 98–107)
CHLORIDE SERPL-SCNC: 95 MMOL/L (ref 98–107)
CLARITY UR: CLEAR
CO2 SERPL-SCNC: 10 MMOL/L (ref 22–29)
CO2 SERPL-SCNC: 21 MMOL/L (ref 22–29)
COHB: 0.2 % (ref 0–1.5)
COLOR UR: YELLOW
CORTIS SERPL-MCNC: 49.4 UG/DL (ref 2.7–18.4)
CORTISOL COLLECTION INFO: ABNORMAL
CREAT SERPL-MCNC: 1.6 MG/DL (ref 0.7–1.2)
CREAT SERPL-MCNC: 1.8 MG/DL (ref 0.7–1.2)
CRITICAL: ABNORMAL
DATE ANALYZED: ABNORMAL
DATE OF COLLECTION: ABNORMAL
EOSINOPHIL # BLD: 0.01 K/UL (ref 0.05–0.5)
EOSINOPHILS RELATIVE PERCENT: 0 % (ref 0–6)
EPI CELLS #/AREA URNS HPF: ABNORMAL /HPF
ERYTHROCYTE [DISTWIDTH] IN BLOOD BY AUTOMATED COUNT: 14.6 % (ref 11.5–15)
GFR SERPL CREATININE-BSD FRML MDRD: 37 ML/MIN/1.73M2
GFR SERPL CREATININE-BSD FRML MDRD: 42 ML/MIN/1.73M2
GLUCOSE SERPL-MCNC: 237 MG/DL (ref 74–99)
GLUCOSE SERPL-MCNC: 378 MG/DL (ref 74–99)
GLUCOSE UR STRIP-MCNC: NEGATIVE MG/DL
HCO3: 13.5 MMOL/L (ref 22–26)
HCT VFR BLD AUTO: 22.1 % (ref 37–54)
HGB BLD-MCNC: 7.4 G/DL (ref 12.5–16.5)
HGB UR QL STRIP.AUTO: ABNORMAL
HHB: 2.3 % (ref 0–5)
IMM GRANULOCYTES # BLD AUTO: 0.43 K/UL (ref 0–0.58)
IMM GRANULOCYTES NFR BLD: 3 % (ref 0–5)
INR PPP: 1.3
KETONES UR STRIP-MCNC: ABNORMAL MG/DL
LAB: ABNORMAL
LACTATE BLDV-SCNC: 10.1 MMOL/L (ref 0.5–1.9)
LACTATE BLDV-SCNC: 21.1 MMOL/L (ref 0.5–1.9)
LEUKOCYTE ESTERASE UR QL STRIP: NEGATIVE
LYMPHOCYTES NFR BLD: 2.24 K/UL (ref 1.5–4)
LYMPHOCYTES RELATIVE PERCENT: 17 % (ref 20–42)
Lab: 1500
MAGNESIUM SERPL-MCNC: 4.1 MG/DL (ref 1.6–2.6)
MCH RBC QN AUTO: 32 PG (ref 26–35)
MCHC RBC AUTO-ENTMCNC: 33.5 G/DL (ref 32–34.5)
MCV RBC AUTO: 95.7 FL (ref 80–99.9)
METHB: 0.3 % (ref 0–1.5)
MODE: AC
MONOCYTES NFR BLD: 0.76 K/UL (ref 0.1–0.95)
MONOCYTES NFR BLD: 6 % (ref 2–12)
NEUTROPHILS NFR BLD: 74 % (ref 43–80)
NEUTS SEG NFR BLD: 9.75 K/UL (ref 1.8–7.3)
NITRITE UR QL STRIP: NEGATIVE
O2 CONTENT: 10.9 ML/DL
O2 SATURATION: 97.7 % (ref 92–98.5)
O2HB: 97.2 % (ref 94–97)
OPERATOR ID: 2873
PARTIAL THROMBOPLASTIN TIME: 26.5 SEC (ref 24.5–35.1)
PATIENT TEMP: 37 C
PCO2: 33.9 MMHG (ref 35–45)
PEEP/CPAP: 8 CMH2O
PH BLOOD GAS: 7.22 (ref 7.35–7.45)
PH UR STRIP: 5.5 [PH] (ref 5–9)
PLATELET # BLD AUTO: 146 K/UL (ref 130–450)
PMV BLD AUTO: 12.5 FL (ref 7–12)
PO2: 122.4 MMHG (ref 75–100)
POTASSIUM SERPL-SCNC: 4.5 MMOL/L (ref 3.5–5)
POTASSIUM SERPL-SCNC: 5.2 MMOL/L (ref 3.5–5)
PROCALCITONIN SERPL-MCNC: 0.59 NG/ML (ref 0–0.08)
PROT SERPL-MCNC: 3.3 G/DL (ref 6.4–8.3)
PROT SERPL-MCNC: 3.6 G/DL (ref 6.4–8.3)
PROT UR STRIP-MCNC: 30 MG/DL
PROTHROMBIN TIME: 14.5 SEC (ref 9.3–12.4)
RBC # BLD AUTO: 2.31 M/UL (ref 3.8–5.8)
RBC # BLD: ABNORMAL 10*6/UL
RBC # BLD: ABNORMAL 10*6/UL
RBC #/AREA URNS HPF: ABNORMAL /HPF
RR MECHANICAL: 18 B/MIN
SARS-COV-2 RDRP RESP QL NAA+PROBE: NOT DETECTED
SODIUM SERPL-SCNC: 130 MMOL/L (ref 132–146)
SODIUM SERPL-SCNC: 131 MMOL/L (ref 132–146)
SOURCE, BLOOD GAS: ABNORMAL
SP GR UR STRIP: 1.02 (ref 1–1.03)
SPECIMEN DESCRIPTION: NORMAL
THB: 7.8 G/DL (ref 11.5–16.5)
TIME ANALYZED: 1508
TROPONIN I SERPL HS-MCNC: 4710 NG/L (ref 0–11)
TROPONIN I SERPL HS-MCNC: 5715 NG/L (ref 0–11)
UROBILINOGEN UR STRIP-ACNC: 0.2 EU/DL (ref 0–1)
VT MECHANICAL: 450 ML
WBC #/AREA URNS HPF: ABNORMAL /HPF
WBC OTHER # BLD: 13.2 K/UL (ref 4.5–11.5)

## 2023-01-01 PROCEDURE — 3E033XZ INTRODUCTION OF VASOPRESSOR INTO PERIPHERAL VEIN, PERCUTANEOUS APPROACH: ICD-10-PCS | Performed by: INTERNAL MEDICINE

## 2023-01-01 PROCEDURE — 6360000002 HC RX W HCPCS: Performed by: EMERGENCY MEDICINE

## 2023-01-01 PROCEDURE — 80053 COMPREHEN METABOLIC PANEL: CPT

## 2023-01-01 PROCEDURE — 2580000003 HC RX 258

## 2023-01-01 PROCEDURE — 81001 URINALYSIS AUTO W/SCOPE: CPT

## 2023-01-01 PROCEDURE — 5A1935Z RESPIRATORY VENTILATION, LESS THAN 24 CONSECUTIVE HOURS: ICD-10-PCS | Performed by: INTERNAL MEDICINE

## 2023-01-01 PROCEDURE — 96375 TX/PRO/DX INJ NEW DRUG ADDON: CPT

## 2023-01-01 PROCEDURE — 87635 SARS-COV-2 COVID-19 AMP PRB: CPT

## 2023-01-01 PROCEDURE — 2580000003 HC RX 258: Performed by: EMERGENCY MEDICINE

## 2023-01-01 PROCEDURE — 0BH17EZ INSERTION OF ENDOTRACHEAL AIRWAY INTO TRACHEA, VIA NATURAL OR ARTIFICIAL OPENING: ICD-10-PCS | Performed by: INTERNAL MEDICINE

## 2023-01-01 PROCEDURE — 2500000003 HC RX 250 WO HCPCS

## 2023-01-01 PROCEDURE — 85730 THROMBOPLASTIN TIME PARTIAL: CPT

## 2023-01-01 PROCEDURE — 93005 ELECTROCARDIOGRAM TRACING: CPT

## 2023-01-01 PROCEDURE — 82805 BLOOD GASES W/O2 SATURATION: CPT

## 2023-01-01 PROCEDURE — 6360000002 HC RX W HCPCS

## 2023-01-01 PROCEDURE — 96361 HYDRATE IV INFUSION ADD-ON: CPT

## 2023-01-01 PROCEDURE — 85025 COMPLETE CBC W/AUTO DIFF WBC: CPT

## 2023-01-01 PROCEDURE — 85610 PROTHROMBIN TIME: CPT

## 2023-01-01 PROCEDURE — 31500 INSERT EMERGENCY AIRWAY: CPT

## 2023-01-01 PROCEDURE — 96374 THER/PROPH/DIAG INJ IV PUSH: CPT

## 2023-01-01 PROCEDURE — 87154 CUL TYP ID BLD PTHGN 6+ TRGT: CPT

## 2023-01-01 PROCEDURE — 84145 PROCALCITONIN (PCT): CPT

## 2023-01-01 PROCEDURE — 71045 X-RAY EXAM CHEST 1 VIEW: CPT

## 2023-01-01 PROCEDURE — 83605 ASSAY OF LACTIC ACID: CPT

## 2023-01-01 PROCEDURE — 82140 ASSAY OF AMMONIA: CPT

## 2023-01-01 PROCEDURE — 2500000003 HC RX 250 WO HCPCS: Performed by: EMERGENCY MEDICINE

## 2023-01-01 PROCEDURE — 5A1221J PERFORMANCE OF CARDIAC OUTPUT, CONTINUOUS, AUTOMATED: ICD-10-PCS | Performed by: INTERNAL MEDICINE

## 2023-01-01 PROCEDURE — 99285 EMERGENCY DEPT VISIT HI MDM: CPT

## 2023-01-01 PROCEDURE — 1200000000 HC SEMI PRIVATE

## 2023-01-01 PROCEDURE — 84484 ASSAY OF TROPONIN QUANT: CPT

## 2023-01-01 PROCEDURE — 99291 CRITICAL CARE FIRST HOUR: CPT | Performed by: INTERNAL MEDICINE

## 2023-01-01 PROCEDURE — 02HV33Z INSERTION OF INFUSION DEVICE INTO SUPERIOR VENA CAVA, PERCUTANEOUS APPROACH: ICD-10-PCS | Performed by: INTERNAL MEDICINE

## 2023-01-01 PROCEDURE — 87086 URINE CULTURE/COLONY COUNT: CPT

## 2023-01-01 PROCEDURE — 87040 BLOOD CULTURE FOR BACTERIA: CPT

## 2023-01-01 PROCEDURE — 83880 ASSAY OF NATRIURETIC PEPTIDE: CPT

## 2023-01-01 PROCEDURE — 83735 ASSAY OF MAGNESIUM: CPT

## 2023-01-01 PROCEDURE — 82533 TOTAL CORTISOL: CPT

## 2023-01-01 RX ORDER — CALCIUM CHLORIDE 100 MG/ML
INJECTION INTRAVENOUS; INTRAVENTRICULAR DAILY PRN
Status: COMPLETED | OUTPATIENT
Start: 2023-01-01 | End: 2023-01-01

## 2023-01-01 RX ORDER — EPINEPHRINE 1 MG/ML
INJECTION, SOLUTION, CONCENTRATE INTRAVENOUS DAILY PRN
Status: COMPLETED | OUTPATIENT
Start: 2023-01-01 | End: 2023-01-01

## 2023-01-01 RX ORDER — 0.9 % SODIUM CHLORIDE 0.9 %
30 INTRAVENOUS SOLUTION INTRAVENOUS ONCE
Status: COMPLETED | OUTPATIENT
Start: 2023-01-01 | End: 2023-01-01

## 2023-01-01 RX ORDER — NOREPINEPHRINE BITARTRATE 0.06 MG/ML
1-100 INJECTION, SOLUTION INTRAVENOUS CONTINUOUS
Status: DISCONTINUED | OUTPATIENT
Start: 2023-01-01 | End: 2023-10-16 | Stop reason: HOSPADM

## 2023-01-01 RX ORDER — EPINEPHRINE IN SOD CHLOR,ISO 1 MG/10 ML
SYRINGE (ML) INTRAVENOUS DAILY PRN
Status: COMPLETED | OUTPATIENT
Start: 2023-01-01 | End: 2023-01-01

## 2023-01-01 RX ORDER — EPINEPHRINE IN SOD CHLOR,ISO 1 MG/10 ML
SYRINGE (ML) INTRAVENOUS
Status: DISCONTINUED
Start: 2023-01-01 | End: 2023-10-16 | Stop reason: HOSPADM

## 2023-01-01 RX ORDER — MAGNESIUM SULFATE HEPTAHYDRATE 500 MG/ML
INJECTION, SOLUTION INTRAMUSCULAR; INTRAVENOUS DAILY PRN
Status: COMPLETED | OUTPATIENT
Start: 2023-01-01 | End: 2023-01-01

## 2023-01-01 RX ORDER — FENTANYL CITRATE-0.9 % NACL/PF 20 MCG/2ML
50 SYRINGE (ML) INTRAVENOUS EVERY 30 MIN PRN
Status: DISCONTINUED | OUTPATIENT
Start: 2023-01-01 | End: 2023-10-16 | Stop reason: HOSPADM

## 2023-01-01 RX ORDER — ROCURONIUM BROMIDE 10 MG/ML
80 INJECTION, SOLUTION INTRAVENOUS ONCE
Status: COMPLETED | OUTPATIENT
Start: 2023-01-01 | End: 2023-01-01

## 2023-01-01 RX ORDER — KETAMINE HYDROCHLORIDE 10 MG/ML
200 INJECTION INTRAMUSCULAR; INTRAVENOUS ONCE
Status: COMPLETED | OUTPATIENT
Start: 2023-01-01 | End: 2023-01-01

## 2023-01-01 RX ADMIN — VANCOMYCIN HYDROCHLORIDE 1500 MG: 10 INJECTION, POWDER, LYOPHILIZED, FOR SOLUTION INTRAVENOUS at 16:34

## 2023-01-01 RX ADMIN — EPINEPHRINE 0.1 MG: 1 INJECTION, SOLUTION INTRAMUSCULAR; SUBCUTANEOUS at 16:15

## 2023-01-01 RX ADMIN — EPINEPHRINE 0.1 MG: 1 INJECTION, SOLUTION INTRAMUSCULAR; SUBCUTANEOUS at 14:11

## 2023-01-01 RX ADMIN — HYDROCORTISONE SODIUM SUCCINATE 100 MG: 100 INJECTION, POWDER, FOR SOLUTION INTRAMUSCULAR; INTRAVENOUS at 15:12

## 2023-01-01 RX ADMIN — Medication 35 MCG/MIN: at 15:14

## 2023-01-01 RX ADMIN — EPINEPHRINE 1 MG: 0.1 INJECTION, SOLUTION ENDOTRACHEAL; INTRACARDIAC; INTRAVENOUS at 15:19

## 2023-01-01 RX ADMIN — SODIUM BICARBONATE 50 MEQ: 84 INJECTION, SOLUTION INTRAVENOUS at 15:20

## 2023-01-01 RX ADMIN — VASOPRESSIN 0.03 UNITS/MIN: 20 INJECTION INTRAVENOUS at 15:45

## 2023-01-01 RX ADMIN — SODIUM BICARBONATE 50 MEQ: 84 INJECTION, SOLUTION INTRAVENOUS at 16:15

## 2023-01-01 RX ADMIN — EPINEPHRINE 1 MG: 0.1 INJECTION, SOLUTION ENDOTRACHEAL; INTRACARDIAC; INTRAVENOUS at 14:16

## 2023-01-01 RX ADMIN — EPINEPHRINE 1 MG: 0.1 INJECTION, SOLUTION ENDOTRACHEAL; INTRACARDIAC; INTRAVENOUS at 17:09

## 2023-01-01 RX ADMIN — KETAMINE HYDROCHLORIDE 200 MG: 10 INJECTION INTRAMUSCULAR; INTRAVENOUS at 14:20

## 2023-01-01 RX ADMIN — EPINEPHRINE 1 MG: 0.1 INJECTION, SOLUTION ENDOTRACHEAL; INTRACARDIAC; INTRAVENOUS at 17:05

## 2023-01-01 RX ADMIN — EPINEPHRINE 1 MG: 0.1 INJECTION, SOLUTION ENDOTRACHEAL; INTRACARDIAC; INTRAVENOUS at 14:14

## 2023-01-01 RX ADMIN — EPINEPHRINE 0.1 MG: 1 INJECTION, SOLUTION INTRAMUSCULAR; SUBCUTANEOUS at 16:11

## 2023-01-01 RX ADMIN — EPINEPHRINE 10 MCG/MIN: 1 INJECTION INTRAMUSCULAR; INTRAVENOUS; SUBCUTANEOUS at 15:39

## 2023-01-01 RX ADMIN — CALCIUM CHLORIDE 1000 MG: 100 INJECTION, SOLUTION INTRAVENOUS at 14:23

## 2023-01-01 RX ADMIN — EPINEPHRINE 10 MCG/MIN: 1 INJECTION INTRAMUSCULAR; INTRAVENOUS; SUBCUTANEOUS at 15:20

## 2023-01-01 RX ADMIN — SODIUM CHLORIDE 1905 ML: 9 INJECTION, SOLUTION INTRAVENOUS at 14:20

## 2023-01-01 RX ADMIN — EPINEPHRINE 1 MG: 0.1 INJECTION, SOLUTION ENDOTRACHEAL; INTRACARDIAC; INTRAVENOUS at 19:11

## 2023-01-01 RX ADMIN — EPINEPHRINE 5 MCG/MIN: 1 INJECTION INTRAMUSCULAR; INTRAVENOUS; SUBCUTANEOUS at 15:19

## 2023-01-01 RX ADMIN — SODIUM BICARBONATE: 84 INJECTION, SOLUTION INTRAVENOUS at 16:32

## 2023-01-01 RX ADMIN — SODIUM BICARBONATE 50 MEQ: 84 INJECTION, SOLUTION INTRAVENOUS at 14:16

## 2023-01-01 RX ADMIN — MAGNESIUM SULFATE HEPTAHYDRATE 2000 MG: 500 INJECTION, SOLUTION INTRAMUSCULAR; INTRAVENOUS at 14:21

## 2023-01-01 RX ADMIN — ROCURONIUM BROMIDE 80 MG: 10 INJECTION, SOLUTION INTRAVENOUS at 14:20

## 2023-01-01 ASSESSMENT — PULMONARY FUNCTION TESTS: PIF_VALUE: 28

## 2023-10-15 PROBLEM — I46.9 CARDIAC ARREST (HCC): Status: ACTIVE | Noted: 2023-01-01

## 2023-10-15 PROBLEM — J96.01 ACUTE RESPIRATORY FAILURE WITH HYPOXIA (HCC): Status: ACTIVE | Noted: 2023-01-01

## 2023-10-15 NOTE — PROGRESS NOTES
Name: Michelle Bruce  : 1935  MRN: [id number]    Date: 10/15/2023    Benefits of immediately proceeding with Radiology exam outweigh the risks and therefore the following is being waived:      [] Pregnancy test    [] Protocol for Iodine allergy    [] MRI questionnaire    [x] BUN/Creatinine        Paulino Pinto DO

## 2023-10-15 NOTE — ED NOTES
Joseph Rowell [place]  10/15/23 8829
1450 - asystole, CPR started.    1451 - epi given, amp bicarb given  1452 - 1 g calcium given  1452 - pulse check PEA, CPR restarted  1453 - epi given  [address] - ROSC - ST       Brittney Quick RN  10/15/23 2224
Alternate contact number for wife - [phone]     Puma Clements RN  10/15/23 [id number]
Attempted to call wife three times will call  for .      Acacia Sanderson RN  10/15/23 0198
Call to [facility] and they were made aware. Call back from [facility] and confirmed information for patient , picking patient up shortly.       Evelia Garcia RN  10/15/23 1929
Dr.Eggleston called wife she is aware of patient death. Wife is aware will be calling to confirm FH. Body can be released when ready.       Katie Ernst RN  10/15/23 1506
Ems reports found patient 76% at SNF , arrived bagging patient. Patient is a FULL code.      Josi Conteh RN  10/15/23 4075
FAST check , no heart activity noted. No viable vital signs.      Marni Schultz RN  10/15/23 8450
Lose of pulse, cpr initiated.      Yuni Sesay RN  10/15/23 0964
Lose pulse , cpr initiated .       Mary Marquez RN  10/15/23 0853
Lose pulse , cpr initiated.       Jose Zamora RN  10/15/23 1004
[facility] called. Reference # [id number].             Josi Conteh RN  10/15/23 9261
Patient arrived in respiratory distress, bradycardic. Respiratory in room, multiple nurses at bedside, pharmacy at bedside,Dr. Lan Teresa at bedside. Patient Prepared for intubation. 1405 200 of ketamine   1406 80 of [place]     #7.5 tube 22 @ lips. Positive color change.        Sean Rojas RN  10/15/23 6739
Pulse check, asystole, resume cpr.       Pretty Samson RN  10/15/23 6884
Pulse check, asystole, resume cpr.      Alyssa Rob RN  10/15/23 3270
Never smoker

## 2023-10-15 NOTE — CONSULTS
No known allergies    Social history:  Social History     Socioeconomic History    Marital status:      Spouse name: Not on file    Number of children: Not on file    Years of education: Not on file    Highest education level: Not on file   Occupational History    Not on file   Tobacco Use    Smoking status: Never    Smokeless tobacco: Never   Substance and Sexual Activity    Alcohol use: No    Drug use: No    Sexual activity: Never   Other Topics Concern    Not on file   Social History Narrative    Not on file     Social Determinants of Health     Financial Resource Strain: Not on file   Food Insecurity: Not on file   Transportation Needs: Not on file   Physical Activity: Not on file   Stress: Not on file   Social Connections: Not on file   Intimate Partner Violence: Not on file   Housing Stability: Not on file       Current Medications:     Current Facility-Administered Medications:     hydrocortisone sodium succinate PF (SOLU-CORTEF) 100 MG injection, , , ,     fentaNYL (SUBLIMAZE) 1,000 mcg in sodium chloride 0.9% 100 mL infusion,  mcg/hr, IntraVENous, Continuous **AND** fentaNYL (SUBLIMAZE) bolus from bag, 50 mcg, IntraVENous, Q30 Min PRN, Dyke Ryann, DO    sodium chloride 0.9 % bolus 1,905 mL, 30 mL/kg, IntraVENous, Once, Dyke Ryann, DO    Current Outpatient Medications:     lactulose (CEPHULAC) 20 g packet, Take 20 g by mouth 2 times daily, Disp: , Rfl:     pantoprazole sodium (PROTONIX) 40 MG PACK packet, Take 40 mg by mouth every morning (before breakfast), Disp: , Rfl:     polyethylene glycol (GLYCOLAX) 17 GM/SCOOP powder, Take 17 g by mouth daily, Disp: , Rfl:     potassium chloride 20 MEQ/15ML (10%) oral solution, Take by mouth 3 times daily, Disp: , Rfl:     nystatin (MYCOSTATIN) 323457 UNIT/GM powder, Apply topically Apply topically to left neck for itching, Disp: , Rfl:     clonazePAM (KLONOPIN) 0.5 MG tablet, Take 0.5 mg by mouth 3 times daily, Disp: , Rfl:

## 2023-10-15 NOTE — PROGRESS NOTES
Alternative contact phone number for patient's wife: [phone]. States message can be left at this number.

## 2023-10-16 LAB
EKG ATRIAL RATE: 40 BPM
EKG Q-T INTERVAL: 386 MS
EKG QRS DURATION: 102 MS
EKG QTC CALCULATION (BAZETT): 337 MS
EKG R AXIS: 58 DEGREES
EKG T AXIS: -29 DEGREES
EKG VENTRICULAR RATE: 46 BPM

## 2023-10-16 NOTE — H&P
Department of Internal Medicine  History and Physical    PCP: April Ridley DO  Admitting Physician: Dr. Tatiana Ortega  Consultants:   Date of Service: 10/15/2023    CHIEF COMPLAINT:  ams/hypotension/hypoxia    HISTORY OF PRESENT ILLNESS:    Patient is a 82-year-old male who presented to the ED due to altered mental status and respiratory distress. Patient had passed away prior to me seeing him. As such HPI obtained from chart review. Per ED note patient presented from extended-care facility due to altered mental status and respiratory distress. Prior to come to the hospital he was hypoxic and hypotensive. He was being manually bagged and he had a right external jugular IV in place. On presentation patient was intubated for protection of airway. Work-up for further evaluation management was started. However patient went into asystole and ACLS protocol was initiated. Patient lost pulse multiple times in ROSC was obtained after ACLS. Critical care was consulted and patient excepted to ICU. Patient was admitted to the hospital per internal medicine.   However patient continued to arrest and eventually passed away at 513 pm.        PAST MEDICAL Hx:  Past Medical History:   Diagnosis Date    Altered mental status     Alzheimer's dementia ([address])     Anemia     Arthritis     Chronic kidney failure     Dementia (HCC)     Dysphagia     GERD (gastroesophageal reflux disease)     Hyperlipidemia     Hypertension     Malnutrition ([address])     Parkinson disease ([address])     Skin cancer     treating skin ca up to one year ago       PAST SURGICAL Hx:   Past Surgical History:   Procedure Laterality Date    ENDOSCOPY, COLON, DIAGNOSTIC      peg tube insertion    HEMORRHOID SURGERY      HERNIA REPAIR      NOSE SURGERY         FAMILY Hx:  Family History   Problem Relation Age of Onset    Diabetes Mother     Mental Illness Father     Mental Illness Sister        HOME MEDICATIONS:  Prior to Admission medications    Medication Sig

## 2023-10-17 LAB
MICROORGANISM SPEC CULT: NO GROWTH
SPECIMEN DESCRIPTION: NORMAL

## 2023-10-18 LAB
ACB COMPLEX DNA BLD POS QL NAA+NON-PROBE: NOT DETECTED
B FRAGILIS DNA BLD POS QL NAA+NON-PROBE: NOT DETECTED
BIOFIRE TEST COMMENT: ABNORMAL
C ALBICANS DNA BLD POS QL NAA+NON-PROBE: NOT DETECTED
C AURIS DNA BLD POS QL NAA+NON-PROBE: NOT DETECTED
C GATTII+NEOFOR DNA BLD POS QL NAA+N-PRB: NOT DETECTED
C GLABRATA DNA BLD POS QL NAA+NON-PROBE: NOT DETECTED
C KRUSEI DNA BLD POS QL NAA+NON-PROBE: NOT DETECTED
C PARAP DNA BLD POS QL NAA+NON-PROBE: NOT DETECTED
C TROPICLS DNA BLD POS QL NAA+NON-PROBE: NOT DETECTED
E CLOAC COMP DNA BLD POS NAA+NON-PROBE: NOT DETECTED
E COLI DNA BLD POS QL NAA+NON-PROBE: NOT DETECTED
E FAECALIS DNA BLD POS QL NAA+NON-PROBE: NOT DETECTED
E FAECIUM DNA BLD POS QL NAA+NON-PROBE: NOT DETECTED
ENTEROBACTERALES DNA BLD POS NAA+N-PRB: NOT DETECTED
GP B STREP DNA BLD POS QL NAA+NON-PROBE: NOT DETECTED
HAEM INFLU DNA BLD POS QL NAA+NON-PROBE: NOT DETECTED
K OXYTOCA DNA BLD POS QL NAA+NON-PROBE: NOT DETECTED
KLEBSIELLA SP DNA BLD POS QL NAA+NON-PRB: NOT DETECTED
KLEBSIELLA SP DNA BLD POS QL NAA+NON-PRB: NOT DETECTED
L MONOCYTOG DNA BLD POS QL NAA+NON-PROBE: NOT DETECTED
MICROORGANISM SPEC CULT: ABNORMAL
MICROORGANISM/AGENT SPEC: ABNORMAL
N MEN DNA BLD POS QL NAA+NON-PROBE: NOT DETECTED
P AERUGINOSA DNA BLD POS NAA+NON-PROBE: NOT DETECTED
PROTEUS SP DNA BLD POS QL NAA+NON-PROBE: NOT DETECTED
S AUREUS DNA BLD POS QL NAA+NON-PROBE: NOT DETECTED
S AUREUS+CONS DNA BLD POS NAA+NON-PROBE: DETECTED
S EPIDERMIDIS DNA BLD POS QL NAA+NON-PRB: NOT DETECTED
S LUGDUNENSIS DNA BLD POS QL NAA+NON-PRB: NOT DETECTED
S MALTOPHILIA DNA BLD POS QL NAA+NON-PRB: NOT DETECTED
S MARCESCENS DNA BLD POS NAA+NON-PROBE: NOT DETECTED
S PNEUM DNA BLD POS QL NAA+NON-PROBE: NOT DETECTED
S PYO DNA BLD POS QL NAA+NON-PROBE: NOT DETECTED
SALMONELLA DNA BLD POS QL NAA+NON-PROBE: NOT DETECTED
SERVICE CMNT-IMP: ABNORMAL
SPECIMEN DESCRIPTION: ABNORMAL
STREPTOCOCCUS DNA BLD POS NAA+NON-PROBE: NOT DETECTED

## 2023-10-20 LAB
MICROORGANISM SPEC CULT: NORMAL
SERVICE CMNT-IMP: NORMAL
SPECIMEN DESCRIPTION: NORMAL